# Patient Record
Sex: MALE | Race: OTHER | Employment: FULL TIME | URBAN - METROPOLITAN AREA
[De-identification: names, ages, dates, MRNs, and addresses within clinical notes are randomized per-mention and may not be internally consistent; named-entity substitution may affect disease eponyms.]

---

## 2020-07-20 ENCOUNTER — HOSPITAL ENCOUNTER (INPATIENT)
Age: 25
LOS: 2 days | Discharge: HOME | DRG: 923 | End: 2020-07-22
Attending: EMERGENCY MEDICINE | Admitting: FAMILY MEDICINE
Payer: SELF-PAY

## 2020-07-20 DIAGNOSIS — N17.9 AKI (ACUTE KIDNEY INJURY) (HCC): Primary | ICD-10-CM

## 2020-07-20 DIAGNOSIS — E86.0 DEHYDRATION: ICD-10-CM

## 2020-07-20 DIAGNOSIS — E11.65 TYPE 2 DIABETES MELLITUS WITH HYPERGLYCEMIA, WITHOUT LONG-TERM CURRENT USE OF INSULIN (HCC): ICD-10-CM

## 2020-07-20 PROBLEM — E87.1 HYPONATREMIA: Status: ACTIVE | Noted: 2020-07-20

## 2020-07-20 PROBLEM — R79.89 ELEVATED LFTS: Status: ACTIVE | Noted: 2020-07-20

## 2020-07-20 LAB
ALBUMIN SERPL-MCNC: 4.7 G/DL (ref 3.5–5)
ALBUMIN/GLOB SERPL: 1.1 {RATIO} (ref 1.2–3.5)
ALP SERPL-CCNC: 112 U/L (ref 50–136)
ALT SERPL-CCNC: 391 U/L (ref 12–65)
ANION GAP SERPL CALC-SCNC: 13 MMOL/L (ref 7–16)
AST SERPL-CCNC: 162 U/L (ref 15–37)
BASOPHILS # BLD: 0.1 K/UL (ref 0–0.2)
BASOPHILS NFR BLD: 1 % (ref 0–2)
BILIRUB SERPL-MCNC: 1.3 MG/DL (ref 0.2–1.1)
BUN SERPL-MCNC: 18 MG/DL (ref 6–23)
CALCIUM SERPL-MCNC: 9.9 MG/DL (ref 8.3–10.4)
CHLORIDE SERPL-SCNC: 93 MMOL/L (ref 98–107)
CO2 SERPL-SCNC: 25 MMOL/L (ref 21–32)
CREAT SERPL-MCNC: 2.68 MG/DL (ref 0.8–1.5)
DIFFERENTIAL METHOD BLD: ABNORMAL
EOSINOPHIL # BLD: 0.1 K/UL (ref 0–0.8)
EOSINOPHIL NFR BLD: 1 % (ref 0.5–7.8)
ERYTHROCYTE [DISTWIDTH] IN BLOOD BY AUTOMATED COUNT: 12.3 % (ref 11.9–14.6)
GLOBULIN SER CALC-MCNC: 4.2 G/DL (ref 2.3–3.5)
GLUCOSE BLD STRIP.AUTO-MCNC: 230 MG/DL (ref 65–100)
GLUCOSE BLD STRIP.AUTO-MCNC: 296 MG/DL (ref 65–100)
GLUCOSE SERPL-MCNC: 279 MG/DL (ref 65–100)
HCT VFR BLD AUTO: 45 % (ref 41.1–50.3)
HGB BLD-MCNC: 16.1 G/DL (ref 13.6–17.2)
IMM GRANULOCYTES # BLD AUTO: 0.1 K/UL (ref 0–0.5)
IMM GRANULOCYTES NFR BLD AUTO: 1 % (ref 0–5)
LYMPHOCYTES # BLD: 1.2 K/UL (ref 0.5–4.6)
LYMPHOCYTES NFR BLD: 12 % (ref 13–44)
MCH RBC QN AUTO: 31.4 PG (ref 26.1–32.9)
MCHC RBC AUTO-ENTMCNC: 35.8 G/DL (ref 31.4–35)
MCV RBC AUTO: 87.9 FL (ref 79.6–97.8)
MONOCYTES # BLD: 0.8 K/UL (ref 0.1–1.3)
MONOCYTES NFR BLD: 8 % (ref 4–12)
NEUTS SEG # BLD: 8.2 K/UL (ref 1.7–8.2)
NEUTS SEG NFR BLD: 79 % (ref 43–78)
NRBC # BLD: 0 K/UL (ref 0–0.2)
PLATELET # BLD AUTO: 239 K/UL (ref 150–450)
PMV BLD AUTO: 9.7 FL (ref 9.4–12.3)
POTASSIUM SERPL-SCNC: 3.7 MMOL/L (ref 3.5–5.1)
PROT SERPL-MCNC: 8.9 G/DL (ref 6.3–8.2)
RBC # BLD AUTO: 5.12 M/UL (ref 4.23–5.6)
SODIUM SERPL-SCNC: 131 MMOL/L (ref 136–145)
WBC # BLD AUTO: 10.4 K/UL (ref 4.3–11.1)

## 2020-07-20 PROCEDURE — 74011250636 HC RX REV CODE- 250/636: Performed by: EMERGENCY MEDICINE

## 2020-07-20 PROCEDURE — 65270000029 HC RM PRIVATE

## 2020-07-20 PROCEDURE — 74011636637 HC RX REV CODE- 636/637: Performed by: FAMILY MEDICINE

## 2020-07-20 PROCEDURE — 85025 COMPLETE CBC W/AUTO DIFF WBC: CPT

## 2020-07-20 PROCEDURE — 96360 HYDRATION IV INFUSION INIT: CPT

## 2020-07-20 PROCEDURE — 80053 COMPREHEN METABOLIC PANEL: CPT

## 2020-07-20 PROCEDURE — 99284 EMERGENCY DEPT VISIT MOD MDM: CPT

## 2020-07-20 PROCEDURE — 81003 URINALYSIS AUTO W/O SCOPE: CPT

## 2020-07-20 PROCEDURE — 82962 GLUCOSE BLOOD TEST: CPT

## 2020-07-20 PROCEDURE — 74011250636 HC RX REV CODE- 250/636: Performed by: FAMILY MEDICINE

## 2020-07-20 RX ORDER — METFORMIN HYDROCHLORIDE 500 MG/1
500 TABLET ORAL 2 TIMES DAILY WITH MEALS
Status: DISCONTINUED | OUTPATIENT
Start: 2020-07-21 | End: 2020-07-21

## 2020-07-20 RX ORDER — ONDANSETRON 2 MG/ML
4 INJECTION INTRAMUSCULAR; INTRAVENOUS
Status: DISCONTINUED | OUTPATIENT
Start: 2020-07-20 | End: 2020-07-22 | Stop reason: HOSPADM

## 2020-07-20 RX ORDER — ADHESIVE BANDAGE
30 BANDAGE TOPICAL DAILY PRN
Status: DISCONTINUED | OUTPATIENT
Start: 2020-07-20 | End: 2020-07-22 | Stop reason: HOSPADM

## 2020-07-20 RX ORDER — METFORMIN HYDROCHLORIDE 500 MG/1
1000 TABLET ORAL 2 TIMES DAILY WITH MEALS
COMMUNITY
End: 2020-07-22

## 2020-07-20 RX ORDER — SODIUM CHLORIDE 0.9 % (FLUSH) 0.9 %
5-40 SYRINGE (ML) INJECTION EVERY 8 HOURS
Status: DISCONTINUED | OUTPATIENT
Start: 2020-07-20 | End: 2020-07-22 | Stop reason: HOSPADM

## 2020-07-20 RX ORDER — SODIUM CHLORIDE 9 MG/ML
150 INJECTION, SOLUTION INTRAVENOUS CONTINUOUS
Status: DISCONTINUED | OUTPATIENT
Start: 2020-07-20 | End: 2020-07-21

## 2020-07-20 RX ORDER — SODIUM CHLORIDE, SODIUM LACTATE, POTASSIUM CHLORIDE, CALCIUM CHLORIDE 600; 310; 30; 20 MG/100ML; MG/100ML; MG/100ML; MG/100ML
125 INJECTION, SOLUTION INTRAVENOUS CONTINUOUS
Status: DISCONTINUED | OUTPATIENT
Start: 2020-07-20 | End: 2020-07-22 | Stop reason: HOSPADM

## 2020-07-20 RX ORDER — HEPARIN SODIUM 5000 [USP'U]/ML
5000 INJECTION, SOLUTION INTRAVENOUS; SUBCUTANEOUS EVERY 8 HOURS
Status: DISCONTINUED | OUTPATIENT
Start: 2020-07-20 | End: 2020-07-22 | Stop reason: HOSPADM

## 2020-07-20 RX ORDER — SODIUM CHLORIDE 0.9 % (FLUSH) 0.9 %
5-40 SYRINGE (ML) INJECTION AS NEEDED
Status: DISCONTINUED | OUTPATIENT
Start: 2020-07-20 | End: 2020-07-22 | Stop reason: HOSPADM

## 2020-07-20 RX ORDER — ACETAMINOPHEN 325 MG/1
650 TABLET ORAL
Status: DISCONTINUED | OUTPATIENT
Start: 2020-07-20 | End: 2020-07-22 | Stop reason: HOSPADM

## 2020-07-20 RX ADMIN — HEPARIN SODIUM 5000 UNITS: 5000 INJECTION INTRAVENOUS; SUBCUTANEOUS at 22:45

## 2020-07-20 RX ADMIN — SODIUM CHLORIDE 150 ML/HR: 900 INJECTION, SOLUTION INTRAVENOUS at 22:45

## 2020-07-20 RX ADMIN — INSULIN HUMAN 6 UNITS: 100 INJECTION, SOLUTION PARENTERAL at 22:45

## 2020-07-20 RX ADMIN — SODIUM CHLORIDE, SODIUM LACTATE, POTASSIUM CHLORIDE, AND CALCIUM CHLORIDE 150 ML/HR: 600; 310; 30; 20 INJECTION, SOLUTION INTRAVENOUS at 20:23

## 2020-07-20 RX ADMIN — SODIUM CHLORIDE 1000 ML: 900 INJECTION, SOLUTION INTRAVENOUS at 18:55

## 2020-07-20 NOTE — ED PROVIDER NOTES
Patient with headache lightheadedness and nausea and vomiting today. Checked his blood sugar and found it to be above 300. Patient has a history of type 2 diabetes mellitus and takes Glucophage 500 twice daily but no insulin. He was diagnosed 1 year ago. He denies any abdominal pain or diarrhea with this nausea and vomiting. He denies runny nose congestion sore throat cough or fever. He denies blood in his bowel movements or black bowel movements or any urinary symptoms. The history is provided by the patient. High Blood Sugar    This is a recurrent problem. The current episode started 3 to 5 hours ago. The problem occurs constantly. The problem has been gradually improving. The pain is associated with vomiting. The patient is experiencing no pain. Associated symptoms include nausea and vomiting. Pertinent negatives include no fever, no diarrhea, no hematochezia, no melena, no dysuria, no frequency, no headaches, no arthralgias, no myalgias, no chest pain and no back pain. Nothing worsens the pain. The pain is relieved by nothing. Past Medical History:   Diagnosis Date    Diabetes ([facility] [address] [address]     type 2. Takes metformin    Hypertension        History reviewed. No pertinent surgical history. History reviewed. No pertinent family history. Social History     Socioeconomic History    Marital status: Not on file     Spouse name: Not on file    Number of children: Not on file    Years of education: Not on file    Highest education level: Not on file   Occupational History    Not on file   Social Needs    Financial resource strain: Not on file    Food insecurity     Worry: Not on file     Inability: Not on file    Transportation needs     Medical: Not on file     Non-medical: Not on file   Tobacco Use    Smoking status: Never Smoker    Smokeless tobacco: Never Used   Substance and Sexual Activity    Alcohol use:  Yes     Alcohol/week: 24.0 standard drinks     Types: 24 Cans of beer per week  Drug use: Not Currently    Sexual activity: Not on file   Lifestyle    Physical activity     Days per week: Not on file     Minutes per session: Not on file    Stress: Not on file   Relationships    Social connections     Talks on phone: Not on file     Gets together: Not on file     Attends Hindu service: Not on file     Active member of club or organization: Not on file     Attends meetings of clubs or organizations: Not on file     Relationship status: Not on file    Intimate partner violence     Fear of current or ex partner: Not on file     Emotionally abused: Not on file     Physically abused: Not on file     Forced sexual activity: Not on file   Other Topics Concern    Not on file   Social History Narrative    Not on file         ALLERGIES: Patient has no known allergies. Review of Systems   Constitutional: Negative for chills and fever. HENT: Negative for congestion, rhinorrhea and sore throat. Eyes: Negative for photophobia and redness. Respiratory: Negative for cough and shortness of breath. Cardiovascular: Negative for chest pain and leg swelling. Gastrointestinal: Positive for nausea and vomiting. Negative for abdominal pain, diarrhea, hematochezia and melena. Endocrine: Positive for polyuria. Negative for polydipsia. Genitourinary: Negative for dysuria and frequency. Musculoskeletal: Negative for arthralgias, back pain and myalgias. Neurological: Negative for weakness, numbness and headaches. Vitals:    07/20/20 1830   BP: 141/85   Pulse: 100   Resp: 18   Temp: 99.5 °F (37.5 °C)   SpO2: 98%   Weight: 104.3 kg (230 lb)   Height: 5' 5\" (1.651 m)            Physical Exam  Vitals signs and nursing note reviewed. Constitutional:       Appearance: He is well-developed. HENT:      Mouth/Throat:      Pharynx: No oropharyngeal exudate. Eyes:      Conjunctiva/sclera: Conjunctivae normal.      Pupils: Pupils are equal, round, and reactive to light.    Neck: Musculoskeletal: Neck supple. Cardiovascular:      Rate and Rhythm: Normal rate and regular rhythm. Heart sounds: Normal heart sounds. Pulmonary:      Effort: Pulmonary effort is normal.      Breath sounds: Normal breath sounds. Abdominal:      General: Bowel sounds are normal. There is no distension. Palpations: Abdomen is soft. Tenderness: There is no abdominal tenderness. There is no guarding or rebound. Musculoskeletal: Normal range of motion. General: No tenderness. Lymphadenopathy:      Cervical: No cervical adenopathy. Skin:     General: Skin is warm and dry. Neurological:      Mental Status: He is alert and oriented to person, place, and time. MDM  Number of Diagnoses or Management Options  Diagnosis management comments: Nausea is resolved upon my valuation and blood sugar is 290 on fingerstick.          Procedures

## 2020-07-20 NOTE — ED NOTES
Took bedside report from Amy Hahn, [facility]. Call bell within  Reach, no needs at this time.

## 2020-07-21 ENCOUNTER — APPOINTMENT (OUTPATIENT)
Dept: GENERAL RADIOLOGY | Age: 25
DRG: 923 | End: 2020-07-21
Payer: SELF-PAY

## 2020-07-21 LAB
ALBUMIN SERPL-MCNC: 4 G/DL (ref 3.5–5)
ALBUMIN/GLOB SERPL: 1.1 {RATIO} (ref 1.2–3.5)
ALP SERPL-CCNC: 97 U/L (ref 50–136)
ALT SERPL-CCNC: 327 U/L (ref 12–65)
ANION GAP SERPL CALC-SCNC: 5 MMOL/L (ref 7–16)
ANION GAP SERPL CALC-SCNC: 9 MMOL/L (ref 7–16)
APPEARANCE UR: CLEAR
AST SERPL-CCNC: 119 U/L (ref 15–37)
BASOPHILS # BLD: 0 K/UL (ref 0–0.2)
BASOPHILS # BLD: 0.1 K/UL (ref 0–0.2)
BASOPHILS NFR BLD: 1 % (ref 0–2)
BASOPHILS NFR BLD: 1 % (ref 0–2)
BILIRUB SERPL-MCNC: 1 MG/DL (ref 0.2–1.1)
BILIRUB UR QL: NEGATIVE
BUN SERPL-MCNC: 14 MG/DL (ref 6–23)
BUN SERPL-MCNC: 9 MG/DL (ref 6–23)
CALCIUM SERPL-MCNC: 8.3 MG/DL (ref 8.3–10.4)
CALCIUM SERPL-MCNC: 9 MG/DL (ref 8.3–10.4)
CHLORIDE SERPL-SCNC: 100 MMOL/L (ref 98–107)
CHLORIDE SERPL-SCNC: 103 MMOL/L (ref 98–107)
CO2 SERPL-SCNC: 26 MMOL/L (ref 21–32)
CO2 SERPL-SCNC: 28 MMOL/L (ref 21–32)
COLOR UR: YELLOW
CREAT SERPL-MCNC: 0.81 MG/DL (ref 0.8–1.5)
CREAT SERPL-MCNC: 1.25 MG/DL (ref 0.8–1.5)
DIFFERENTIAL METHOD BLD: ABNORMAL
DIFFERENTIAL METHOD BLD: NORMAL
EOSINOPHIL # BLD: 0.2 K/UL (ref 0–0.8)
EOSINOPHIL # BLD: 0.2 K/UL (ref 0–0.8)
EOSINOPHIL NFR BLD: 4 % (ref 0.5–7.8)
EOSINOPHIL NFR BLD: 4 % (ref 0.5–7.8)
ERYTHROCYTE [DISTWIDTH] IN BLOOD BY AUTOMATED COUNT: 12.6 % (ref 11.9–14.6)
ERYTHROCYTE [DISTWIDTH] IN BLOOD BY AUTOMATED COUNT: 12.6 % (ref 11.9–14.6)
EST. AVERAGE GLUCOSE BLD GHB EST-MCNC: 223 MG/DL
GLOBULIN SER CALC-MCNC: 3.5 G/DL (ref 2.3–3.5)
GLUCOSE BLD STRIP.AUTO-MCNC: 154 MG/DL (ref 65–100)
GLUCOSE BLD STRIP.AUTO-MCNC: 230 MG/DL (ref 65–100)
GLUCOSE BLD STRIP.AUTO-MCNC: 269 MG/DL (ref 65–100)
GLUCOSE BLD STRIP.AUTO-MCNC: 306 MG/DL (ref 65–100)
GLUCOSE SERPL-MCNC: 119 MG/DL (ref 65–100)
GLUCOSE SERPL-MCNC: 260 MG/DL (ref 65–100)
GLUCOSE UR STRIP.AUTO-MCNC: NEGATIVE MG/DL
HBA1C MFR BLD: 9.4 %
HCT VFR BLD AUTO: 37.7 % (ref 41.1–50.3)
HCT VFR BLD AUTO: 43.7 % (ref 41.1–50.3)
HGB BLD-MCNC: 13 G/DL (ref 13.6–17.2)
HGB BLD-MCNC: 15.3 G/DL (ref 13.6–17.2)
HGB UR QL STRIP: NEGATIVE
IMM GRANULOCYTES # BLD AUTO: 0 K/UL (ref 0–0.5)
IMM GRANULOCYTES # BLD AUTO: 0 K/UL (ref 0–0.5)
IMM GRANULOCYTES NFR BLD AUTO: 1 % (ref 0–5)
IMM GRANULOCYTES NFR BLD AUTO: 1 % (ref 0–5)
KETONES UR QL STRIP.AUTO: NEGATIVE MG/DL
LACTATE SERPL-SCNC: 0.8 MMOL/L (ref 0.4–2)
LEUKOCYTE ESTERASE UR QL STRIP.AUTO: NEGATIVE
LYMPHOCYTES # BLD: 1.8 K/UL (ref 0.5–4.6)
LYMPHOCYTES # BLD: 1.9 K/UL (ref 0.5–4.6)
LYMPHOCYTES NFR BLD: 30 % (ref 13–44)
LYMPHOCYTES NFR BLD: 30 % (ref 13–44)
MAGNESIUM SERPL-MCNC: 1.6 MG/DL (ref 1.8–2.4)
MCH RBC QN AUTO: 31 PG (ref 26.1–32.9)
MCH RBC QN AUTO: 31.5 PG (ref 26.1–32.9)
MCHC RBC AUTO-ENTMCNC: 34.5 G/DL (ref 31.4–35)
MCHC RBC AUTO-ENTMCNC: 35 G/DL (ref 31.4–35)
MCV RBC AUTO: 89.8 FL (ref 79.6–97.8)
MCV RBC AUTO: 89.9 FL (ref 79.6–97.8)
MONOCYTES # BLD: 0.5 K/UL (ref 0.1–1.3)
MONOCYTES # BLD: 0.6 K/UL (ref 0.1–1.3)
MONOCYTES NFR BLD: 8 % (ref 4–12)
MONOCYTES NFR BLD: 9 % (ref 4–12)
NEUTS SEG # BLD: 3.3 K/UL (ref 1.7–8.2)
NEUTS SEG # BLD: 3.7 K/UL (ref 1.7–8.2)
NEUTS SEG NFR BLD: 56 % (ref 43–78)
NEUTS SEG NFR BLD: 57 % (ref 43–78)
NITRITE UR QL STRIP.AUTO: NEGATIVE
NRBC # BLD: 0 K/UL (ref 0–0.2)
NRBC # BLD: 0 K/UL (ref 0–0.2)
PH UR STRIP: 5.5 [PH] (ref 5–9)
PLATELET # BLD AUTO: 199 K/UL (ref 150–450)
PLATELET # BLD AUTO: 226 K/UL (ref 150–450)
PMV BLD AUTO: 9.5 FL (ref 9.4–12.3)
PMV BLD AUTO: 9.7 FL (ref 9.4–12.3)
POTASSIUM SERPL-SCNC: 3.2 MMOL/L (ref 3.5–5.1)
POTASSIUM SERPL-SCNC: 3.2 MMOL/L (ref 3.5–5.1)
PROT SERPL-MCNC: 7.5 G/DL (ref 6.3–8.2)
PROT UR STRIP-MCNC: NEGATIVE MG/DL
RBC # BLD AUTO: 4.2 M/UL (ref 4.23–5.6)
RBC # BLD AUTO: 4.86 M/UL (ref 4.23–5.6)
SODIUM SERPL-SCNC: 135 MMOL/L (ref 136–145)
SODIUM SERPL-SCNC: 136 MMOL/L (ref 136–145)
SP GR UR REFRACTOMETRY: 1.01 (ref 1–1.02)
UROBILINOGEN UR QL STRIP.AUTO: 1 EU/DL (ref 0.2–1)
WBC # BLD AUTO: 5.9 K/UL (ref 4.3–11.1)
WBC # BLD AUTO: 6.5 K/UL (ref 4.3–11.1)

## 2020-07-21 PROCEDURE — 83735 ASSAY OF MAGNESIUM: CPT

## 2020-07-21 PROCEDURE — 74011250636 HC RX REV CODE- 250/636: Performed by: NURSE PRACTITIONER

## 2020-07-21 PROCEDURE — 74011250636 HC RX REV CODE- 250/636: Performed by: FAMILY MEDICINE

## 2020-07-21 PROCEDURE — 87040 BLOOD CULTURE FOR BACTERIA: CPT

## 2020-07-21 PROCEDURE — 85025 COMPLETE CBC W/AUTO DIFF WBC: CPT

## 2020-07-21 PROCEDURE — 74011636637 HC RX REV CODE- 636/637: Performed by: NURSE PRACTITIONER

## 2020-07-21 PROCEDURE — 80053 COMPREHEN METABOLIC PANEL: CPT

## 2020-07-21 PROCEDURE — 83036 HEMOGLOBIN GLYCOSYLATED A1C: CPT

## 2020-07-21 PROCEDURE — 81003 URINALYSIS AUTO W/O SCOPE: CPT

## 2020-07-21 PROCEDURE — 83605 ASSAY OF LACTIC ACID: CPT

## 2020-07-21 PROCEDURE — 82962 GLUCOSE BLOOD TEST: CPT

## 2020-07-21 PROCEDURE — 36415 COLL VENOUS BLD VENIPUNCTURE: CPT

## 2020-07-21 PROCEDURE — 71046 X-RAY EXAM CHEST 2 VIEWS: CPT

## 2020-07-21 PROCEDURE — 87086 URINE CULTURE/COLONY COUNT: CPT

## 2020-07-21 PROCEDURE — 74011250637 HC RX REV CODE- 250/637: Performed by: FAMILY MEDICINE

## 2020-07-21 PROCEDURE — 74011250637 HC RX REV CODE- 250/637: Performed by: NURSE PRACTITIONER

## 2020-07-21 PROCEDURE — 65270000029 HC RM PRIVATE

## 2020-07-21 PROCEDURE — 74011636637 HC RX REV CODE- 636/637: Performed by: FAMILY MEDICINE

## 2020-07-21 RX ORDER — INSULIN LISPRO 100 [IU]/ML
INJECTION, SOLUTION INTRAVENOUS; SUBCUTANEOUS
Status: DISCONTINUED | OUTPATIENT
Start: 2020-07-21 | End: 2020-07-22 | Stop reason: HOSPADM

## 2020-07-21 RX ORDER — TRAMADOL HYDROCHLORIDE 50 MG/1
50 TABLET ORAL
Status: DISCONTINUED | OUTPATIENT
Start: 2020-07-21 | End: 2020-07-22 | Stop reason: HOSPADM

## 2020-07-21 RX ORDER — POTASSIUM CHLORIDE 20 MEQ/1
40 TABLET, EXTENDED RELEASE ORAL
Status: COMPLETED | OUTPATIENT
Start: 2020-07-21 | End: 2020-07-21

## 2020-07-21 RX ORDER — MAGNESIUM SULFATE HEPTAHYDRATE 40 MG/ML
2 INJECTION, SOLUTION INTRAVENOUS ONCE
Status: COMPLETED | OUTPATIENT
Start: 2020-07-21 | End: 2020-07-21

## 2020-07-21 RX ADMIN — INSULIN HUMAN 9 UNITS: 100 INJECTION, SOLUTION PARENTERAL at 07:31

## 2020-07-21 RX ADMIN — MAGNESIUM SULFATE HEPTAHYDRATE 2 G: 40 INJECTION, SOLUTION INTRAVENOUS at 19:22

## 2020-07-21 RX ADMIN — INSULIN HUMAN 6 UNITS: 100 INJECTION, SOLUTION PARENTERAL at 11:56

## 2020-07-21 RX ADMIN — METFORMIN HYDROCHLORIDE 500 MG: 500 TABLET ORAL at 07:29

## 2020-07-21 RX ADMIN — POTASSIUM CHLORIDE 40 MEQ: 1500 TABLET, EXTENDED RELEASE ORAL at 12:16

## 2020-07-21 RX ADMIN — INSULIN LISPRO 8 UNITS: 100 INJECTION, SOLUTION INTRAVENOUS; SUBCUTANEOUS at 21:26

## 2020-07-21 RX ADMIN — INSULIN LISPRO 2 UNITS: 100 INJECTION, SOLUTION INTRAVENOUS; SUBCUTANEOUS at 16:15

## 2020-07-21 RX ADMIN — POTASSIUM CHLORIDE 40 MEQ: 1500 TABLET, EXTENDED RELEASE ORAL at 16:14

## 2020-07-21 RX ADMIN — HEPARIN SODIUM 5000 UNITS: 5000 INJECTION INTRAVENOUS; SUBCUTANEOUS at 06:36

## 2020-07-21 RX ADMIN — POTASSIUM CHLORIDE 40 MEQ: 1500 TABLET, EXTENDED RELEASE ORAL at 19:22

## 2020-07-21 RX ADMIN — ACETAMINOPHEN 650 MG: 325 TABLET, FILM COATED ORAL at 16:14

## 2020-07-21 RX ADMIN — SODIUM CHLORIDE 150 ML/HR: 900 INJECTION, SOLUTION INTRAVENOUS at 06:38

## 2020-07-21 RX ADMIN — Medication 10 ML: at 22:00

## 2020-07-21 NOTE — H&P
HOSPITALIST INITIAL HISTORY AND PHYSICAL    NAME:  Balwinder Hightower   Age:  26 y. o.  :   1995   MRN:   [id number]  PCP: Chriss Allan MD  Consulting MD:  Treatment Team: Attending Provider: Heaven Hatch MD; Primary Nurse: Jean Alcaraz RN    CHIEF COMPLAINT: vomiting, weakness    HISTORY OF PRESENT ILLNESS:   Balwinder Hightower is a 26 y.o. male with a past medical history of DM type II and HTN who presents to the ER with report of feeling weak and having nausea and vomiting earlier today. He reports that he has otherwise felt well. Check his BG daily at home and reports mostly seeing low 100s. Denies any fevers, chills, diarrhea, abdominal pain. REVIEW OF SYSTEMS: Comprehensive ROS performed. Admits to nausea, vomiting. Denies fevers, chills, diarrhea, abdominal pain, otherwise negative. Past Medical History:   Diagnosis Date    Diabetes ([facility] [address]     type 2. Takes metformin    Hypertension         History reviewed. No pertinent surgical history. Prior to Admission Medications   Prescriptions Last Dose Informant Patient Reported? Taking?   metFORMIN (GLUCOPHAGE) 500 mg tablet   Yes Yes   Sig: Take 500 mg by mouth two (2) times daily (with meals). Indications: type 2 diabetes mellitus      Facility-Administered Medications: None       No Known Allergies    FAMILY HISTORY: Reviewed. Negative except History reviewed. No pertinent family history. Social History     Tobacco Use    Smoking status: Never Smoker    Smokeless tobacco: Never Used   Substance Use Topics    Alcohol use:  Yes     Alcohol/week: 24.0 standard drinks     Types: 24 Cans of beer per week         Objective:     Visit Vitals  /73   Pulse 91   Temp 99.5 °F (37.5 °C)   Resp 18   Ht 5' 5\" (1.651 m)   Wt 104.3 kg (230 lb)   SpO2 98%   BMI 38.27 kg/m²      Temp (24hrs), Av.5 °F (37.5 °C), Min:99.5 °F (37.5 °C), Max:99.5 °F (37.5 °C)    Oxygen Therapy  O2 Sat (%): 98 % (20)  O2 Device: Room air (07/20/20 1830)  Physical Exam:  General:    The patient is a pleasant young male in no acute distress. Head:   Normocephalic/atraumatic. Eyes:  No palpebral pallor or scleral icterus. ENT:  External auricular and nasal exam within normal limits. Mucous membranes are moist.  Neck:  Supple, non-tender, no JVD. Lungs:   Clear to auscultation bilaterally without wheezes or crackles. No respiratory distress or accessory muscle use. Heart:   Regular rate and rhythm, without murmurs, rubs, or gallops. Abdomen:   Soft, non-tender, non-distended with normoactive bowel sounds. Genitourinary: No tenderness over the bladder or bilateral CVAs. Extremities: Without clubbing, cyanosis, or edema. Skin:     Normal color, texture, and turgor. No rashes, lesions, or jaundice. Pulses: Radial and dorsalis pedis pulses present 2+ bilaterally. Capillary refill <2s. Neurologic: CN II-XII grossly intact and symmetrical.     Moving all four extremities well with no focal deficits. Psychiatric: Pleasant demeanor, appropriate affect. Alert and oriented x 3    Data Review:   Recent Results (from the past 24 hour(s))   GLUCOSE, POC    Collection Time: 07/20/20  6:39 PM   Result Value Ref Range    Glucose (POC) 296 (H) 65 - 100 mg/dL   CBC WITH AUTOMATED DIFF    Collection Time: 07/20/20  6:47 PM   Result Value Ref Range    WBC 10.4 4.3 - 11.1 K/uL    RBC 5.12 4.23 - 5.6 M/uL    HGB 16.1 13.6 - 17.2 g/dL    HCT 45.0 41.1 - 50.3 %    MCV 87.9 79.6 - 97.8 FL    MCH 31.4 26.1 - 32.9 PG    MCHC 35.8 (H) 31.4 - 35.0 g/dL    RDW 12.3 11.9 - 14.6 %    PLATELET 998 095 - 920 K/uL    MPV 9.7 9.4 - 12.3 FL    ABSOLUTE NRBC 0.00 0.0 - 0.2 K/uL    DF AUTOMATED      NEUTROPHILS 79 (H) 43 - 78 %    LYMPHOCYTES 12 (L) 13 - 44 %    MONOCYTES 8 4.0 - 12.0 %    EOSINOPHILS 1 0.5 - 7.8 %    BASOPHILS 1 0.0 - 2.0 %    IMMATURE GRANULOCYTES 1 0.0 - 5.0 %    ABS. NEUTROPHILS 8.2 1.7 - 8.2 K/UL    ABS.  LYMPHOCYTES 1.2 0.5 - 4.6 K/UL ABS. MONOCYTES 0.8 0.1 - 1.3 K/UL    ABS. EOSINOPHILS 0.1 0.0 - 0.8 K/UL    ABS. BASOPHILS 0.1 0.0 - 0.2 K/UL    ABS. IMM. GRANS. 0.1 0.0 - 0.5 K/UL   METABOLIC PANEL, COMPREHENSIVE    Collection Time: 07/20/20  6:47 PM   Result Value Ref Range    Sodium 131 (L) 136 - 145 mmol/L    Potassium 3.7 3.5 - 5.1 mmol/L    Chloride 93 (L) 98 - 107 mmol/L    CO2 25 21 - 32 mmol/L    Anion gap 13 7 - 16 mmol/L    Glucose 279 (H) 65 - 100 mg/dL    BUN 18 6 - 23 MG/DL    Creatinine 2.68 (H) 0.8 - 1.5 MG/DL    GFR est AA 38 (L) >60 ml/min/1.73m2    GFR est non-AA 31 (L) >60 ml/min/1.73m2    Calcium 9.9 8.3 - 10.4 MG/DL    Bilirubin, total 1.3 (H) 0.2 - 1.1 MG/DL    ALT (SGPT) 391 (H) 12 - 65 U/L    AST (SGOT) 162 (H) 15 - 37 U/L    Alk. phosphatase 112 50 - 136 U/L    Protein, total 8.9 (H) 6.3 - 8.2 g/dL    Albumin 4.7 3.5 - 5.0 g/dL    Globulin 4.2 (H) 2.3 - 3.5 g/dL    A-G Ratio 1.1 (L) 1.2 - 3.5         Imaging /Procedures /Studies:  No results found. Assessment and Plan:     Principal Problem:    [facility] (7/20/2020)    Likely 2/2 dehydration. IVF, follow BMP. Active Problems:    Hyperglycemia due to type 2 diabetes mellitus ([facility] [address] (7/20/2020)    Home medications, SSI, IVF      Hyponatremia (7/20/2020)    IV NS, follow BMP. Elevated LFTs (7/20/2020)    Mild, IVF, follow CMP, consider RUQ US. DVT Prophylaxis: Heparin      Code Status: FULL CODE      Disposition: Admit to med/surg for evaluation and treatment as per above.       Anticipated discharge: 2-3 days     Signed By: Mehran Gregorio MD     July 20, 2020

## 2020-07-21 NOTE — PROGRESS NOTES
07/21/20 1530   Dual Skin Pressure Injury Assessment   Dual Skin Pressure Injury Assessment WDL   Second Care Provider (Based on [facility]) [facility]

## 2020-07-21 NOTE — PROGRESS NOTES
Problem: Falls - Risk of  Goal: *Absence of Falls  Description: Document [facility] Fall Risk and appropriate interventions in the flowsheet.   Outcome: Progressing Towards Goal  Note: Fall Risk Interventions:            Medication Interventions: Teach patient to arise slowly

## 2020-07-21 NOTE — PROGRESS NOTES
Visited with patient as no PCP listed in chart. Patient states no PCP and no insurance. Explained the [address] program in detail and provided patient with resources. Contact information for [facility] with [address] given to patient and patient encouraged to follow up with her as soon as possible. Patient also given contact information for Daron Lira with [employer] and encouraged to call to get screened for Medicaid/Insurance eligibility and/or financial assistance.      Lena Comings, [place]    [address] [place] [address]    * [email or web address]    ( [phone]

## 2020-07-21 NOTE — ED NOTES
Started new ivf, emptied 800 clear yellow urine from urinal. No needs at this time, call bell within reach

## 2020-07-21 NOTE — ED NOTES
Explained to pt why staying in ER. Helped pt get into hospital gown for comfort. Hung ivf, gave heparin and insulin. Provided water, crackers , urinal and call bell.  Pt in nad with no needs at this time

## 2020-07-21 NOTE — PROGRESS NOTES
END OF SHIFT NOTE:    Intake/Output  No intake/output data recorded. Voiding: YES  Catheter: NO  Drain:              Stool:  0 occurrences. Emesis:  0 occurrences. VITAL SIGNS  Patient Vitals for the past 12 hrs:   Temp Pulse Resp BP SpO2   07/21/20 1518 (!) 100.8 °F (38.2 °C) 74 18 127/79 99 %       Pain Assessment  Pain 1  Pain Scale 1: Numeric (0 - 10) (07/21/20 1530)  Pain Intensity 1: 0 (07/21/20 1530)  Patient Stated Pain Goal: 0 (07/21/20 1530)  Pain Onset 1: 16:00 today (07/20/20 1830)  Pain Location 1: Flank (07/20/20 1830)  Pain Orientation 1: Left;Right (07/20/20 1830)    Ambulating  Yes    Additional Information: Patient arrived around 1500. Was running fever when he came up. BC done, chest x-ray. Needs urine. VSS. No needs voiced. Shift report given to oncoming nurse at the bedside.     Yasmani Butts

## 2020-07-21 NOTE — PROGRESS NOTES
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 Unable to retain any fluids. Glucose on arrival 282. Patient reports medication compliance, checks glucose daily, usually in the high 100's. States he was taking insulin for the fist six months, dropping A1C into the 5s. He was then changed to metformin 1000 mg bid with slowly climbing A1C since, believes the last one to be in the 7s. A1C done today 9.4. Replacing Mag and K+. IVF continued. ADDITIONAL HISTORY:  NIDDM diagnosed 2019 on metformin, hypertension, drinks 4 beers daily,     Current Facility-Administered Medications   Medication Dose Route Frequency    lactated Ringers infusion  150 mL/hr IntraVENous CONTINUOUS    metFORMIN (GLUCOPHAGE) tablet 500 mg  500 mg Oral BID WITH MEALS    insulin regular (NOVOLIN R, HUMULIN R) injection   SubCUTAneous AC&HS    sodium chloride (NS) flush 5-40 mL  5-40 mL IntraVENous Q8H    sodium chloride (NS) flush 5-40 mL  5-40 mL IntraVENous PRN    acetaminophen (TYLENOL) tablet 650 mg  650 mg Oral Q4H PRN    ondansetron (ZOFRAN) injection 4 mg  4 mg IntraVENous Q4H PRN    magnesium hydroxide (MILK OF MAGNESIA) 400 mg/5 mL oral suspension 30 mL  30 mL Oral DAILY PRN    0.9% sodium chloride infusion  150 mL/hr IntraVENous CONTINUOUS    heparin (porcine) injection 5,000 Units  5,000 Units SubCUTAneous Q8H     Current Outpatient Medications   Medication Sig    metFORMIN (GLUCOPHAGE) 500 mg tablet Take 500 mg by mouth two (2) times daily (with meals). Indications: type 2 diabetes mellitus      Review of Systems  A comprehensive review of systems was negative except for that written in the HPI.     Objective:     Visit Vitals  /66   Pulse 88 Temp 98.8 °F (37.1 °C)   Resp 18   Ht 5' 5\" (1.651 m)   Wt 104.3 kg (230 lb)   SpO2 96%   BMI 38.27 kg/m²      O2 Device: Room air    Temp (24hrs), Av.2 °F (37.3 °C), Min:98.8 °F (37.1 °C), Max:99.5 °F (37.5 °C)    General appearance: Oriented, alert, cooperative, does not appear acutely ill or in DKA. Well nourished, dry mucus membranes. Head: Normocephalic, without obvious abnormality, atraumatic  Eyes: conjunctivae/corneas clear. PERRL  Throat: Lips, mucosa, and tongue dry. Teeth and gums normal  Neck: supple, symmetrical, trachea midline, and no JVD  Lungs: clear to auscultation bilaterally  Heart: regular rate and rhythm, S1, S2 normal, no murmur, click, rub or gallop  Abdomen: soft, non-tender. Bowel sounds normal. No masses,  no organomegaly  Extremities: extremities normal, atraumatic, no cyanosis or edema  Skin: Skin color normal, texture, turgor dry. No rashes or lesions  Neurologic: Grossly normal    Additional comments: Notes,orders, test results, vitals reviewed    Data Review  Recent Results (from the past 24 hour(s))   GLUCOSE, POC    Collection Time: 20  6:39 PM   Result Value Ref Range    Glucose (POC) 296 (H) 65 - 100 mg/dL   CBC WITH AUTOMATED DIFF    Collection Time: 20  6:47 PM   Result Value Ref Range    WBC 10.4 4.3 - 11.1 K/uL    RBC 5.12 4.23 - 5.6 M/uL    HGB 16.1 13.6 - 17.2 g/dL    HCT 45.0 41.1 - 50.3 %    MCV 87.9 79.6 - 97.8 FL    MCH 31.4 26.1 - 32.9 PG    MCHC 35.8 (H) 31.4 - 35.0 g/dL    RDW 12.3 11.9 - 14.6 %    PLATELET 557 138 - 172 K/uL    MPV 9.7 9.4 - 12.3 FL    ABSOLUTE NRBC 0.00 0.0 - 0.2 K/uL    DF AUTOMATED      NEUTROPHILS 79 (H) 43 - 78 %    LYMPHOCYTES 12 (L) 13 - 44 %    MONOCYTES 8 4.0 - 12.0 %    EOSINOPHILS 1 0.5 - 7.8 %    BASOPHILS 1 0.0 - 2.0 %    IMMATURE GRANULOCYTES 1 0.0 - 5.0 %    ABS. NEUTROPHILS 8.2 1.7 - 8.2 K/UL    ABS. LYMPHOCYTES 1.2 0.5 - 4.6 K/UL    ABS. MONOCYTES 0.8 0.1 - 1.3 K/UL    ABS.  EOSINOPHILS 0.1 0.0 - 0.8 K/UL ABS. BASOPHILS 0.1 0.0 - 0.2 K/UL    ABS. IMM. GRANS. 0.1 0.0 - 0.5 K/UL   METABOLIC PANEL, COMPREHENSIVE    Collection Time: 07/20/20  6:47 PM   Result Value Ref Range    Sodium 131 (L) 136 - 145 mmol/L    Potassium 3.7 3.5 - 5.1 mmol/L    Chloride 93 (L) 98 - 107 mmol/L    CO2 25 21 - 32 mmol/L    Anion gap 13 7 - 16 mmol/L    Glucose 279 (H) 65 - 100 mg/dL    BUN 18 6 - 23 MG/DL    Creatinine 2.68 (H) 0.8 - 1.5 MG/DL    GFR est AA 38 (L) >60 ml/min/1.73m2    GFR est non-AA 31 (L) >60 ml/min/1.73m2    Calcium 9.9 8.3 - 10.4 MG/DL    Bilirubin, total 1.3 (H) 0.2 - 1.1 MG/DL    ALT (SGPT) 391 (H) 12 - 65 U/L    AST (SGOT) 162 (H) 15 - 37 U/L    Alk.  phosphatase 112 50 - 136 U/L    Protein, total 8.9 (H) 6.3 - 8.2 g/dL    Albumin 4.7 3.5 - 5.0 g/dL    Globulin 4.2 (H) 2.3 - 3.5 g/dL    A-G Ratio 1.1 (L) 1.2 - 3.5     GLUCOSE, POC    Collection Time: 07/20/20 10:44 PM   Result Value Ref Range    Glucose (POC) 230 (H) 65 - 683 mg/dL   METABOLIC PANEL, BASIC    Collection Time: 07/21/20  4:31 AM   Result Value Ref Range    Sodium 135 (L) 136 - 145 mmol/L    Potassium 3.2 (L) 3.5 - 5.1 mmol/L    Chloride 100 98 - 107 mmol/L    CO2 26 21 - 32 mmol/L    Anion gap 9 7 - 16 mmol/L    Glucose 260 (H) 65 - 100 mg/dL    BUN 14 6 - 23 MG/DL    Creatinine 1.25 0.8 - 1.5 MG/DL    GFR est AA >60 >60 ml/min/1.73m2    GFR est non-AA >60 >60 ml/min/1.73m2    Calcium 8.3 8.3 - 10.4 MG/DL   CBC WITH AUTOMATED DIFF    Collection Time: 07/21/20  4:31 AM   Result Value Ref Range    WBC 6.5 4.3 - 11.1 K/uL    RBC 4.20 (L) 4.23 - 5.6 M/uL    HGB 13.0 (L) 13.6 - 17.2 g/dL    HCT 37.7 (L) 41.1 - 50.3 %    MCV 89.8 79.6 - 97.8 FL    MCH 31.0 26.1 - 32.9 PG    MCHC 34.5 31.4 - 35.0 g/dL    RDW 12.6 11.9 - 14.6 %    PLATELET 788 990 - 997 K/uL    MPV 9.5 9.4 - 12.3 FL    ABSOLUTE NRBC 0.00 0.0 - 0.2 K/uL    DF AUTOMATED      NEUTROPHILS 57 43 - 78 %    LYMPHOCYTES 30 13 - 44 %    MONOCYTES 9 4.0 - 12.0 %    EOSINOPHILS 4 0.5 - 7.8 %    BASOPHILS 1 0.0 - 2.0 %    IMMATURE GRANULOCYTES 1 0.0 - 5.0 %    ABS. NEUTROPHILS 3.7 1.7 - 8.2 K/UL    ABS. LYMPHOCYTES 1.9 0.5 - 4.6 K/UL    ABS. MONOCYTES 0.6 0.1 - 1.3 K/UL    ABS. EOSINOPHILS 0.2 0.0 - 0.8 K/UL    ABS. BASOPHILS 0.0 0.0 - 0.2 K/UL    ABS. IMM. GRANS. 0.0 0.0 - 0.5 K/UL      CXR:  No acute cardiopulmonary abnormality.      Assessment/Plan:   Nausea and vomiting:  Resolved    Heat exhaustion with dehydration:  Resolving with IVF, supportive care     RINKU:  Most likely due to dehydration    Continuing LR   Encourage po intake     Hyponatremia/Hypokalemia/hypomagnesemia:  Replace and recheck at 1800 and in am      Elevated LFTs:  Most likely due to acute illness   Drinks 4 beers daily    NIDDM: Poor control with A1C: 9.4   May need to go back on insulin, will need close follow up   Will need information for [facility] or free clinic    Holding metformin    Hyperglycemia: Change ssi to actid and HS   Consider discharging on insulin     Will need work note to go back to work thursday or [employer] discussed with: Patient and Nurse    Signed By: Gertrude Bernheim, NP     July 21, 2020

## 2020-07-21 NOTE — PROGRESS NOTES
SW reviewed patient's chart and conducted a baseline assessment. Discharge plan at this time is as follows:     Care Management Interventions  PCP Verified by CM: Yes([facility])  Mode of Transport at Discharge: Self  Transition of Care Consult (CM Consult): Discharge Planning  Current Support Network: Own Home  Confirm Follow Up Transport: Family  Discharge Location  Discharge Placement: Home with family assistance      *Please note that discharge plans can change throughout an inpatient admission.  Ensure that you are referring to the most recent social work/nurse case management note for current discharge plan*     Baljit Pantoja, [address] Work   [facility]    * [email or web address]    ( [phone]

## 2020-07-22 VITALS
DIASTOLIC BLOOD PRESSURE: 80 MMHG | SYSTOLIC BLOOD PRESSURE: 129 MMHG | WEIGHT: 229 LBS | RESPIRATION RATE: 16 BRPM | HEART RATE: 61 BPM | TEMPERATURE: 97.5 F | BODY MASS INDEX: 38.15 KG/M2 | HEIGHT: 65 IN | OXYGEN SATURATION: 96 %

## 2020-07-22 LAB
ALBUMIN SERPL-MCNC: 3.4 G/DL (ref 3.5–5)
ALBUMIN/GLOB SERPL: 1 {RATIO} (ref 1.2–3.5)
ALP SERPL-CCNC: 104 U/L (ref 50–136)
ALT SERPL-CCNC: 292 U/L (ref 12–65)
ANION GAP SERPL CALC-SCNC: 2 MMOL/L (ref 7–16)
AST SERPL-CCNC: 108 U/L (ref 15–37)
BASOPHILS # BLD: 0.1 K/UL (ref 0–0.2)
BASOPHILS NFR BLD: 1 % (ref 0–2)
BILIRUB DIRECT SERPL-MCNC: 0.2 MG/DL
BILIRUB SERPL-MCNC: 0.8 MG/DL (ref 0.2–1.1)
BUN SERPL-MCNC: 7 MG/DL (ref 6–23)
CALCIUM SERPL-MCNC: 8.6 MG/DL (ref 8.3–10.4)
CHLORIDE SERPL-SCNC: 103 MMOL/L (ref 98–107)
CO2 SERPL-SCNC: 30 MMOL/L (ref 21–32)
CREAT SERPL-MCNC: 0.88 MG/DL (ref 0.8–1.5)
DIFFERENTIAL METHOD BLD: ABNORMAL
EOSINOPHIL # BLD: 0.2 K/UL (ref 0–0.8)
EOSINOPHIL NFR BLD: 4 % (ref 0.5–7.8)
ERYTHROCYTE [DISTWIDTH] IN BLOOD BY AUTOMATED COUNT: 12.6 % (ref 11.9–14.6)
GLOBULIN SER CALC-MCNC: 3.4 G/DL (ref 2.3–3.5)
GLUCOSE BLD STRIP.AUTO-MCNC: 287 MG/DL (ref 65–100)
GLUCOSE SERPL-MCNC: 229 MG/DL (ref 65–100)
HCT VFR BLD AUTO: 39.8 % (ref 41.1–50.3)
HGB BLD-MCNC: 13.8 G/DL (ref 13.6–17.2)
IMM GRANULOCYTES # BLD AUTO: 0 K/UL (ref 0–0.5)
IMM GRANULOCYTES NFR BLD AUTO: 1 % (ref 0–5)
LYMPHOCYTES # BLD: 1.9 K/UL (ref 0.5–4.6)
LYMPHOCYTES NFR BLD: 32 % (ref 13–44)
MAGNESIUM SERPL-MCNC: 1.6 MG/DL (ref 1.8–2.4)
MCH RBC QN AUTO: 31.9 PG (ref 26.1–32.9)
MCHC RBC AUTO-ENTMCNC: 34.7 G/DL (ref 31.4–35)
MCV RBC AUTO: 91.9 FL (ref 79.6–97.8)
MONOCYTES # BLD: 0.5 K/UL (ref 0.1–1.3)
MONOCYTES NFR BLD: 8 % (ref 4–12)
NEUTS SEG # BLD: 3.3 K/UL (ref 1.7–8.2)
NEUTS SEG NFR BLD: 55 % (ref 43–78)
NRBC # BLD: 0 K/UL (ref 0–0.2)
PLATELET # BLD AUTO: 200 K/UL (ref 150–450)
PMV BLD AUTO: 9.6 FL (ref 9.4–12.3)
POTASSIUM SERPL-SCNC: 3.4 MMOL/L (ref 3.5–5.1)
PROT SERPL-MCNC: 6.8 G/DL (ref 6.3–8.2)
RBC # BLD AUTO: 4.33 M/UL (ref 4.23–5.6)
SODIUM SERPL-SCNC: 135 MMOL/L (ref 136–145)
WBC # BLD AUTO: 6 K/UL (ref 4.3–11.1)

## 2020-07-22 PROCEDURE — 80048 BASIC METABOLIC PNL TOTAL CA: CPT

## 2020-07-22 PROCEDURE — 85025 COMPLETE CBC W/AUTO DIFF WBC: CPT

## 2020-07-22 PROCEDURE — 83735 ASSAY OF MAGNESIUM: CPT

## 2020-07-22 PROCEDURE — 74011636637 HC RX REV CODE- 636/637: Performed by: NURSE PRACTITIONER

## 2020-07-22 PROCEDURE — 80076 HEPATIC FUNCTION PANEL: CPT

## 2020-07-22 PROCEDURE — 74011250636 HC RX REV CODE- 250/636: Performed by: NURSE PRACTITIONER

## 2020-07-22 PROCEDURE — 74011250637 HC RX REV CODE- 250/637: Performed by: NURSE PRACTITIONER

## 2020-07-22 PROCEDURE — 36415 COLL VENOUS BLD VENIPUNCTURE: CPT

## 2020-07-22 PROCEDURE — 74011250636 HC RX REV CODE- 250/636: Performed by: FAMILY MEDICINE

## 2020-07-22 PROCEDURE — 82962 GLUCOSE BLOOD TEST: CPT

## 2020-07-22 RX ORDER — METFORMIN HYDROCHLORIDE 1000 MG/1
1000 TABLET ORAL 2 TIMES DAILY WITH MEALS
Qty: 60 TAB | Refills: 1 | Status: SHIPPED | OUTPATIENT
Start: 2020-07-22 | End: 2020-08-21

## 2020-07-22 RX ORDER — ATORVASTATIN CALCIUM 20 MG/1
20 TABLET, FILM COATED ORAL DAILY
Qty: 30 TAB | Refills: 1 | Status: SHIPPED | OUTPATIENT
Start: 2020-07-22 | End: 2020-08-21

## 2020-07-22 RX ORDER — POTASSIUM CHLORIDE 20 MEQ/1
40 TABLET, EXTENDED RELEASE ORAL
Status: COMPLETED | OUTPATIENT
Start: 2020-07-22 | End: 2020-07-22

## 2020-07-22 RX ORDER — GLIPIZIDE 5 MG/1
5 TABLET ORAL 2 TIMES DAILY
Qty: 60 TAB | Refills: 1 | Status: SHIPPED | OUTPATIENT
Start: 2020-07-22 | End: 2020-08-21

## 2020-07-22 RX ORDER — LISINOPRIL 2.5 MG/1
2.5 TABLET ORAL DAILY
Qty: 30 TAB | Refills: 1 | Status: SHIPPED | OUTPATIENT
Start: 2020-07-22 | End: 2020-08-21

## 2020-07-22 RX ORDER — MAGNESIUM SULFATE HEPTAHYDRATE 40 MG/ML
2 INJECTION, SOLUTION INTRAVENOUS ONCE
Status: COMPLETED | OUTPATIENT
Start: 2020-07-22 | End: 2020-07-22

## 2020-07-22 RX ADMIN — INSULIN LISPRO 6 UNITS: 100 INJECTION, SOLUTION INTRAVENOUS; SUBCUTANEOUS at 08:19

## 2020-07-22 RX ADMIN — MAGNESIUM SULFATE HEPTAHYDRATE 2 G: 40 INJECTION, SOLUTION INTRAVENOUS at 09:44

## 2020-07-22 RX ADMIN — POTASSIUM CHLORIDE 40 MEQ: 1500 TABLET, EXTENDED RELEASE ORAL at 09:44

## 2020-07-22 RX ADMIN — HEPARIN SODIUM 5000 UNITS: 5000 INJECTION INTRAVENOUS; SUBCUTANEOUS at 05:06

## 2020-07-22 NOTE — PROGRESS NOTES
Care Management Interventions  PCP Verified by CM: (set up appointment with [facility] for July 29)  Palliative Care Criteria Met (RRAT>21 & CHF Dx)?: No(Risk 7% Dx RINKU )  Mode of Transport at Discharge: Self  Transition of Care Consult (CM Consult): Discharge Planning  Discharge Durable Medical Equipment: No  Physical Therapy Consult: No  Occupational Therapy Consult: No  Speech Therapy Consult: No  Current Support Network: Relative's Home(lives with his parents)  Confirm Follow Up Transport: Self  Discharge Location  Discharge Placement: Home  Met with patient for d/c planning. Patient is medically stable for d/c. He is alert and oriented x 3, independent of ADL's and lives with his parents. He works at [employer] and incomes approximately $1200/month. He has been seen by [facility] for any assistance he may qualify. He was given information on [facility], [facility], [facility], [employer] application and has appointment set up with [facility] for July 29, 2020. He was encouraged to follow up with education for his diabetes and provided information on [employer] option education. He states hopes to have insurance soon but has not worked at his job long enough to [employer]. Reviewed his medication cost with [facility] and he states he can afford the amount and was given discount card for [facility]. Patient voices understanding of information. He states does have a glucometer but ran out of strips, He voices no concerns or needs for d/c. Patient to d/c home with family.

## 2020-07-22 NOTE — DIABETES MGMT
Patient admitted with acute kidney injury. Admitting blood glucose 279. HbA1c 9.4 (eAG 223). Blood glucose ranged 119-306 yesterday with patient receiving Humalog 10 units, Regular 15 units, and Metfomin 500mg. Blood glucose this morning was 287. Reviewed these numbers and their significance with patient. Offered interpretering services to patient as was told he spoke [place] Patient also speaks [place] and requests educational materials be provided in [place]. Patient politely declined further interpreting services. Patient to discharge home today, spoke with provider discussed patient history and glycemic control, provider plans to discharge patient on oral medications and have patient follow up at [facility]. Patient seen for assessment regarding diabetes management. Patient has a past medical history of DM, HTN. Patient states they were diagnosed 1.5 years ago with diabetes and voices a positive family history of diabetes. Patient states they do have a working glucometer \"a [address] meter\" at home but he ran out of strips 2 days ago. Patient provided with a sample meter with 10 strips and instructed to make sure he goes and gets more of the strips for his meter over the counter. Patient verbalized understanding. Per patient they typically check blood glucose levels 1 times a day in the morning or afternoon. Per patient their blood glucose levels have been running 200s at home. Patient states they are currently taking Metformin BID at home for management of diabetes, but admits that he had been told to hold that medication if his glucose was normal and states he stopped taking it for weeks. Patient has not attended formal diabetes education in the past. Patient reports no difficulty with affording their diabetic supplies and has been educated regarding [employer] insulins and over-the-counter glucometer options. Patient also educated regarding free and $4 list medications at some pharmacies.     Pt given educational material, \"Diabetes Self-Management: A Patient Teaching Guide\", which was reviewed with pt. Explained basic physiology of diabetes, as well as causes, s/s, and treatments for hypoglycemia and hyperglycemia. Patient states he has noticed headaches, weakness, dry mouth, and increased thirst. Described the effects of poor glycemic control on the development of long-term complications such as renal, eye, nerve, and cardiovascular disease. Patient voices a family history of heart disease. Also discussed the importance of good blood pressure control on overall health. Patient eyes got very large when educator described long term complications of diabetes. Described proper diabetic foot care and the importance of checking feet qd. Patient denies numbness and tingling in feet. Per patient they typically drink water, regular gatorade, smoothies. Reviewed the effects of sweetened beverages on glycemic control and alternative beverages to help improve glycemic control. Per patient they typically eat an apple or granola bar for lunch, 2 sandwiches on white stating \"my parents do the shopping\" or that he eats  supper typically is at home and he states his mom makes soups. Patient can state foods with carbs include: bread, rice and pasta. Educated regarding the effects of carbohydrates on blood glucose, the \"plate method\" of healthy meal planning, basics of healthy meal plan, Consistent Carbohydrate Diet, discussed the basics of carb counting and how to read a nutrition label. Discussed tips for eating out with diabetes and the importance of portion control. Patient admits that he had lost weight at the beginning of the year and come off medications stating his A1c was in the 5s but then put weight back on during the quarantine. Discussed sustained weight loss. Educated pt regarding the benefits of physical activity (as directed by provider) on glycemic control.  Also explained the relationship between hyperglycemia and infection and delayed healing. Discussed target goals for blood glucose and A1C. Educated pt regarding diabetic medications including mechanism of action, timing, and possible side effects. Patient verbalizes understanding of teaching. Explained the importance of blood glucose monitoring. Recommended frequency of twice a day blood glucose checks fasting and 2 hours post prandial and to record in log book to take to PCP appointment. Patient is self pay. Patient given educational handout regarding over-the-counter glucometers as a more affordable option. Patient is uninsured but would likely benefit from continued diabetes outpatient classes. Encouraged patient to seek out free classes at [facility]. Patient also given contact information regarding DSME for uninsured patients. Encouraged compliance with discharge regimen. Encouraged patient to continue to work on lifestyle modifications and to follow up with PCP at [facility] for further titration of regimen. Patient verbalized understanding and voices no further questions regarding diabetes management at this time.

## 2020-07-22 NOTE — PROGRESS NOTES
I have reviewed discharge instructions with the patient. The patient verbalized understanding. Discharge medications reviewed with patient and appropriate educational materials and side effects teaching were provided.   Pt discharged home ambulatory with family

## 2020-07-22 NOTE — DISCHARGE SUMMARY
Hospitalist Discharge Summary     Admit Date:  2020  6:26 PM   Name:  Ryan Pierre   Age:  26 y. o.  :  1995   MRN:  [id number]   PCP:  Hang, MD Radha  Treatment Team: Attending Provider: Nato Valenzuela MD; : Lei Agarwal; Utilization Review: Steven Cano RN; Nurse Practitioner: Nella Coulter NP    Problem List for this Hospitalization:  Hospital Problems as of 2020 Never Reviewed          Codes Class Noted - Resolved POA    * (Principal) RINKU (acute kidney injury) ([facility] ICD-10-CM: N17.9  ICD-9-CM: 584.9  2020 - Present Yes        Hyponatremia ICD-10-CM: E87.1  ICD-9-CM: 276.1  2020 - Present Yes        Elevated LFTs ICD-10-CM: R79.89  ICD-9-CM: 790.6  2020 - Present Yes        Hyperglycemia due to type 2 diabetes mellitus ([facility] ICD-10-CM: E11.65  ICD-9-CM: 250.00  2020 - Present Yes                Admission HPI from 2020:    NIDDM patient diagnosed one year ago on metformin 1000 mg bid 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 Unable to retain any fluids. Glucose on arrival 282. Patient reports medication compliance, checks glucose daily, usually in the high 100's. States he was taking insulin for the fist six months, dropping A1C into the 5s. He was then changed to metformin 1000 mg bid. Hospital Course:  A1C 9.4 on . He has remained on SSI, ranging upper 200s. I spoke with diabetic educator who kindly agrees to see patient prior to discharge today. Discharge plan to resume Metformin and add Glipizide. Patient does not have insurance and concerns for noncompliance also given A1C. I am adding low dose Lisinopril and Lipitor. Patient has H/O HTN and lipid panel at [facility]  of last year noted elevated triglycerided 317, , cholesterol 266. Mag today 1.6 and  Potassium 3.4.  I am replacing both prior to discharge. Patient remains obese. CM has made appointment for him at [facility] and discharge instructions given. Follow up instructions and discharge meds at bottom of this note. Plan was discussed with patient, CM,  Diabetic educator. All questions answered. Patient was stable at time of discharge. 10 systems reviewed and negative except as noted in HPI. Diagnostic Imaging/Tests:   Xr Chest Pa Lat    Result Date: 7/21/2020  EXAM: 2 view chest radiograph. INDICATION: Fever. COMPARISON: None. FINDINGS: No focal lung consolidation. No pneumothorax. No pleural effusion. The heart is normal in size. No evidence of acute osseous abnormality. IMPRESSION: 1. No acute cardiopulmonary abnormality. Echocardiogram results:  No results found for this visit on 07/20/20. All Micro Results     Procedure Component Value Units Date/Time    CULTURE, URINE [[id number]] Collected:  07/21/20 1905    Order Status:  Completed Specimen:  Urine from Clean catch Updated:  07/22/20 0858     Special Requests: NO SPECIAL REQUESTS        Culture result:       NO GROWTH AFTER SHORT PERIOD OF INCUBATION. FURTHER RESULTS TO FOLLOW AFTER OVERNIGHT INCUBATION.           CULTURE, BLOOD [[id number]] Collected:  07/21/20 1800    Order Status:  Completed Specimen:  Blood Updated:  07/21/20 1818    CULTURE, BLOOD [[id number]] Collected:  07/21/20 1810    Order Status:  Completed Specimen:  Blood Updated:  07/21/20 1818          Labs: Results:       BMP, Mg, Phos Recent Labs     07/22/20  0500 07/21/20  1800 07/21/20  0431   * 136 135*   K 3.4* 3.2* 3.2*    103 100   CO2 30 28 26   AGAP 2* 5* 9   BUN 7 9 14   CREA 0.88 0.81 1.25   CA 8.6 9.0 8.3   * 119* 260*   MG 1.6* 1.6*  --       CBC Recent Labs     07/22/20  0500 07/21/20  1800 07/21/20  0431   WBC 6.0 5.9 6.5   RBC 4.33 4.86 4.20*   HGB 13.8 15.3 13.0*   HCT 39.8* 43.7 37.7*    226 199   GRANS 55 56 57   LYMPH 32 30 30   EOS 4 4 4 MONOS 8 8 9   BASOS 1 1 1   IG 1 1 1   ANEU 3.3 3.3 3.7   ABL 1.9 1.8 1.9   MANDEEP 0.2 0.2 0.2   ABM 0.5 0.5 0.6   ABB 0.1 0.1 0.0   AIG 0.0 0.0 0.0      LFT Recent Labs     07/22/20  0500 07/21/20  1800 07/20/20  1847   * 327* 391*    97 112   TP 6.8 7.5 8.9*   ALB 3.4* 4.0 4.7   GLOB 3.4 3.5 4.2*   AGRAT 1.0* 1.1* 1.1*      Cardiac Testing No results found for: BNPP, BNP, CPK, RCK1, RCK2, RCK3, RCK4, CKMB, CKNDX, CKND1, TROPT, TROIQ   Coagulation Tests No results found for: PTP, INR, APTT, INREXT   A1c Lab Results   Component Value Date/Time    Hemoglobin A1c 9.4 07/21/2020 04:31 AM      Lipid Panel No results found for: CHOL, CHOLPOCT, CHOLX, CHLST, CHOLV, 575293, HDL, HDLP, LDL, LDLC, DLDLP, 689192, VLDLC, VLDL, TGLX, TRIGL, TRIGP, TGLPOCT, CHHD, CHHDX   Thyroid Panel No results found for: TSH, T4, FT4, TT3, T3U, TSHEXT     Most Recent UA Lab Results   Component Value Date/Time    Color YELLOW 07/21/2020 07:05 PM    Appearance CLEAR 07/21/2020 07:05 PM    Specific gravity 1.010 07/21/2020 07:05 PM    pH (UA) 5.5 07/21/2020 07:05 PM    Protein Negative 07/21/2020 07:05 PM    Glucose Negative 07/21/2020 07:05 PM    Ketone Negative 07/21/2020 07:05 PM    Bilirubin Negative 07/21/2020 07:05 PM    Blood Negative 07/21/2020 07:05 PM    Urobilinogen 1.0 07/21/2020 07:05 PM    Nitrites Negative 07/21/2020 07:05 PM    Leukocyte Esterase Negative 07/21/2020 07:05 PM        No Known Allergies    There is no immunization history on file for this patient.     All Labs from Last 24 Hrs:  Recent Results (from the past 24 hour(s))   GLUCOSE, POC    Collection Time: 07/21/20 11:43 AM   Result Value Ref Range    Glucose (POC) 230 (H) 65 - 100 mg/dL   GLUCOSE, POC    Collection Time: 07/21/20  3:22 PM   Result Value Ref Range    Glucose (POC) 154 (H) 65 - 457 mg/dL   METABOLIC PANEL, COMPREHENSIVE    Collection Time: 07/21/20  6:00 PM   Result Value Ref Range    Sodium 136 136 - 145 mmol/L    Potassium 3.2 (L) 3.5 - 5.1 mmol/L    Chloride 103 98 - 107 mmol/L    CO2 28 21 - 32 mmol/L    Anion gap 5 (L) 7 - 16 mmol/L    Glucose 119 (H) 65 - 100 mg/dL    BUN 9 6 - 23 MG/DL    Creatinine 0.81 0.8 - 1.5 MG/DL    GFR est AA >60 >60 ml/min/1.73m2    GFR est non-AA >60 >60 ml/min/1.73m2    Calcium 9.0 8.3 - 10.4 MG/DL    Bilirubin, total 1.0 0.2 - 1.1 MG/DL    ALT (SGPT) 327 (H) 12 - 65 U/L    AST (SGOT) 119 (H) 15 - 37 U/L    Alk. phosphatase 97 50 - 136 U/L    Protein, total 7.5 6.3 - 8.2 g/dL    Albumin 4.0 3.5 - 5.0 g/dL    Globulin 3.5 2.3 - 3.5 g/dL    A-G Ratio 1.1 (L) 1.2 - 3.5     MAGNESIUM    Collection Time: 07/21/20  6:00 PM   Result Value Ref Range    Magnesium 1.6 (L) 1.8 - 2.4 mg/dL   LACTIC ACID    Collection Time: 07/21/20  6:00 PM   Result Value Ref Range    Lactic acid 0.8 0.4 - 2.0 MMOL/L   CBC WITH AUTOMATED DIFF    Collection Time: 07/21/20  6:00 PM   Result Value Ref Range    WBC 5.9 4.3 - 11.1 K/uL    RBC 4.86 4.23 - 5.6 M/uL    HGB 15.3 13.6 - 17.2 g/dL    HCT 43.7 41.1 - 50.3 %    MCV 89.9 79.6 - 97.8 FL    MCH 31.5 26.1 - 32.9 PG    MCHC 35.0 31.4 - 35.0 g/dL    RDW 12.6 11.9 - 14.6 %    PLATELET 678 267 - 294 K/uL    MPV 9.7 9.4 - 12.3 FL    ABSOLUTE NRBC 0.00 0.0 - 0.2 K/uL    DF AUTOMATED      NEUTROPHILS 56 43 - 78 %    LYMPHOCYTES 30 13 - 44 %    MONOCYTES 8 4.0 - 12.0 %    EOSINOPHILS 4 0.5 - 7.8 %    BASOPHILS 1 0.0 - 2.0 %    IMMATURE GRANULOCYTES 1 0.0 - 5.0 %    ABS. NEUTROPHILS 3.3 1.7 - 8.2 K/UL    ABS. LYMPHOCYTES 1.8 0.5 - 4.6 K/UL    ABS. MONOCYTES 0.5 0.1 - 1.3 K/UL    ABS. EOSINOPHILS 0.2 0.0 - 0.8 K/UL    ABS. BASOPHILS 0.1 0.0 - 0.2 K/UL    ABS. IMM.  GRANS. 0.0 0.0 - 0.5 K/UL   URINALYSIS W/ RFLX MICROSCOPIC    Collection Time: 07/21/20  7:05 PM   Result Value Ref Range    Color YELLOW      Appearance CLEAR      Specific gravity 1.010 1.001 - 1.023      pH (UA) 5.5 5.0 - 9.0      Protein Negative NEG mg/dL    Glucose Negative mg/dL    Ketone Negative NEG mg/dL    Bilirubin Negative NEG Blood Negative NEG      Urobilinogen 1.0 0.2 - 1.0 EU/dL    Nitrites Negative NEG      Leukocyte Esterase Negative NEG     CULTURE, URINE    Collection Time: 07/21/20  7:05 PM    Specimen: Clean catch; Urine    URINE   Result Value Ref Range    Special Requests: NO SPECIAL REQUESTS      Culture result:        NO GROWTH AFTER SHORT PERIOD OF INCUBATION. FURTHER RESULTS TO FOLLOW AFTER OVERNIGHT INCUBATION. GLUCOSE, POC    Collection Time: 07/21/20  9:00 PM   Result Value Ref Range    Glucose (POC) 306 (H) 65 - 493 mg/dL   METABOLIC PANEL, BASIC    Collection Time: 07/22/20  5:00 AM   Result Value Ref Range    Sodium 135 (L) 136 - 145 mmol/L    Potassium 3.4 (L) 3.5 - 5.1 mmol/L    Chloride 103 98 - 107 mmol/L    CO2 30 21 - 32 mmol/L    Anion gap 2 (L) 7 - 16 mmol/L    Glucose 229 (H) 65 - 100 mg/dL    BUN 7 6 - 23 MG/DL    Creatinine 0.88 0.8 - 1.5 MG/DL    GFR est AA >60 >60 ml/min/1.73m2    GFR est non-AA >60 >60 ml/min/1.73m2    Calcium 8.6 8.3 - 10.4 MG/DL   CBC WITH AUTOMATED DIFF    Collection Time: 07/22/20  5:00 AM   Result Value Ref Range    WBC 6.0 4.3 - 11.1 K/uL    RBC 4.33 4.23 - 5.6 M/uL    HGB 13.8 13.6 - 17.2 g/dL    HCT 39.8 (L) 41.1 - 50.3 %    MCV 91.9 79.6 - 97.8 FL    MCH 31.9 26.1 - 32.9 PG    MCHC 34.7 31.4 - 35.0 g/dL    RDW 12.6 11.9 - 14.6 %    PLATELET 702 190 - 859 K/uL    MPV 9.6 9.4 - 12.3 FL    ABSOLUTE NRBC 0.00 0.0 - 0.2 K/uL    DF AUTOMATED      NEUTROPHILS 55 43 - 78 %    LYMPHOCYTES 32 13 - 44 %    MONOCYTES 8 4.0 - 12.0 %    EOSINOPHILS 4 0.5 - 7.8 %    BASOPHILS 1 0.0 - 2.0 %    IMMATURE GRANULOCYTES 1 0.0 - 5.0 %    ABS. NEUTROPHILS 3.3 1.7 - 8.2 K/UL    ABS. LYMPHOCYTES 1.9 0.5 - 4.6 K/UL    ABS. MONOCYTES 0.5 0.1 - 1.3 K/UL    ABS. EOSINOPHILS 0.2 0.0 - 0.8 K/UL    ABS. BASOPHILS 0.1 0.0 - 0.2 K/UL    ABS. IMM.  GRANS. 0.0 0.0 - 0.5 K/UL   MAGNESIUM    Collection Time: 07/22/20  5:00 AM   Result Value Ref Range    Magnesium 1.6 (L) 1.8 - 2.4 mg/dL   HEPATIC FUNCTION PANEL Collection Time: 07/22/20  5:00 AM   Result Value Ref Range    Protein, total 6.8 6.3 - 8.2 g/dL    Albumin 3.4 (L) 3.5 - 5.0 g/dL    Globulin 3.4 2.3 - 3.5 g/dL    A-G Ratio 1.0 (L) 1.2 - 3.5      Bilirubin, total 0.8 0.2 - 1.1 MG/DL    Bilirubin, direct 0.2 <0.4 MG/DL    Alk. phosphatase 104 50 - 136 U/L    AST (SGOT) 108 (H) 15 - 37 U/L    ALT (SGPT) 292 (H) 12 - 65 U/L   GLUCOSE, POC    Collection Time: 07/22/20  7:00 AM   Result Value Ref Range    Glucose (POC) 287 (H) 65 - 100 mg/dL       Discharge Exam:  Patient Vitals for the past 24 hrs:   Temp Pulse Resp BP SpO2   07/22/20 0749 97.5 °F (36.4 °C) 61 16 129/80 96 %   07/22/20 0301 99.1 °F (37.3 °C) 63 17 130/85 95 %   07/21/20 2347 98.9 °F (37.2 °C) 67 18 133/79 99 %   07/21/20 2000 98.4 °F (36.9 °C) 72 17 128/72 97 %   07/21/20 1850 98.2 °F (36.8 °C)       07/21/20 1518 (!) 100.8 °F (38.2 °C) 74 18 127/79 99 %     Oxygen Therapy  O2 Sat (%): 96 % (07/22/20 0749)  Pulse via Oximetry: 68 beats per minute (07/21/20 0634)  O2 Device: Room air (07/20/20 1830)    Intake/Output Summary (Last 24 hours) at 7/22/2020 0914  Last data filed at 7/22/2020 0302  Gross per 24 hour   Intake 500 ml   Output    Net 500 ml         Physical exam:  General:    Well nourished. Alert. No distress. Eyes:   Normal sclera. Extraocular movements intact. ENT:  Normocephalic, atraumatic. Moist mucous membranes  CV:   Regular rate and rhythm. No murmur, rub, or gallop. Lungs:  Clear to auscultation bilaterally. No wheezing, rhonchi, or rales. Abdomen: Soft, nontender, nondistended. Bowel sounds normal.   Extremities: Warm and dry. No cyanosis or edema. Neurologic: No focal deficits  Skin:     No rashes or jaundice. Psych:  Normal mood and affect. Discharge Info:   Current Discharge Medication List      START taking these medications    Details   glipiZIDE (GLUCOTROL) 5 mg tablet Take 1 Tab by mouth two (2) times a day for 30 days.   Qty: 60 Tab, Refills: 1 lisinopriL (PRINIVIL, ZESTRIL) 2.5 mg tablet Take 1 Tab by mouth daily for 30 days. Qty: 30 Tab, Refills: 1      atorvastatin (LIPITOR) 20 mg tablet Take 1 Tab by mouth daily for 30 days. Qty: 30 Tab, Refills: 1         CONTINUE these medications which have CHANGED    Details   metFORMIN (GLUCOPHAGE) 1,000 mg tablet Take 1 Tab by mouth two (2) times daily (with meals) for 30 days. Qty: 60 Tab, Refills: 1               Disposition: home    Activity: Activity as tolerated  Diet: DIET DIABETIC CONSISTENT CARB Regular    Follow-up Appointments   Procedures    FOLLOW UP VISIT Appointment in: [facility]     Standing Status:   Standing     Number of Occurrences:   1     Order Specific Question:   Appointment in     Answer: One Week         Follow-up Information     Follow up With Specialties Details Why Contact Info    Other, MD Radha    Patient can only remember the practice name and not the physician            Case reviewed with supervising physician - Dr. Jethro Gilbert    Time spent in patient discharge planning and coordination 35 minutes.     Signed:  Samanta Goodwin NP

## 2020-07-22 NOTE — PROGRESS NOTES
present at bedside via phone conference during diabetes education with nurse MARILIA [address] Department  [facility] [address] [place] [address]  [phone] (phone)   [email or web address]

## 2020-07-23 ENCOUNTER — PATIENT OUTREACH (OUTPATIENT)
Dept: CASE MANAGEMENT | Age: 25
End: 2020-07-23

## 2020-07-23 NOTE — PROGRESS NOTES
1st Attempt to contact patient for [facility] Covid 19  call, no answer on home number .  Will attempt to contact patient again within 24 hours

## 2020-07-24 ENCOUNTER — PATIENT OUTREACH (OUTPATIENT)
Dept: CASE MANAGEMENT | Age: 25
End: 2020-07-24

## 2020-07-24 LAB
BACTERIA SPEC CULT: NORMAL
SERVICE CMNT-IMP: NORMAL

## 2020-07-24 NOTE — PROGRESS NOTES
2nd  attempt to contact patient for [facility] Covid 19 education Call. No answer, on home number . Episode will be closed at this time as [email or web address] has been unsuccessful in reaching the patient.  CC will be removed from the care team.

## 2020-07-26 LAB
BACTERIA SPEC CULT: NORMAL
BACTERIA SPEC CULT: NORMAL
SERVICE CMNT-IMP: NORMAL
SERVICE CMNT-IMP: NORMAL

## 2021-05-15 ENCOUNTER — HOSPITAL ENCOUNTER (EMERGENCY)
Age: 26
Discharge: HOME | End: 2021-05-15
Attending: EMERGENCY MEDICINE

## 2021-05-15 VITALS
OXYGEN SATURATION: 98 % | HEIGHT: 64 IN | RESPIRATION RATE: 18 BRPM | HEART RATE: 85 BPM | BODY MASS INDEX: 36.7 KG/M2 | TEMPERATURE: 99 F | DIASTOLIC BLOOD PRESSURE: 80 MMHG | WEIGHT: 215 LBS | SYSTOLIC BLOOD PRESSURE: 149 MMHG

## 2021-05-15 DIAGNOSIS — R11.2 NAUSEA AND VOMITING, INTRACTABILITY OF VOMITING NOT SPECIFIED, UNSPECIFIED VOMITING TYPE: Primary | ICD-10-CM

## 2021-05-15 LAB
ALBUMIN SERPL-MCNC: 5.4 G/DL (ref 3.5–5)
ALBUMIN/GLOB SERPL: 1.4 {RATIO} (ref 1.2–3.5)
ALP SERPL-CCNC: 127 U/L (ref 50–136)
ALT SERPL-CCNC: 187 U/L (ref 12–65)
ANION GAP SERPL CALC-SCNC: 11 MMOL/L (ref 7–16)
AST SERPL-CCNC: 89 U/L (ref 15–37)
BACTERIA URNS QL MICRO: NORMAL /HPF
BASOPHILS # BLD: 0.1 K/UL (ref 0–0.2)
BASOPHILS NFR BLD: 1 % (ref 0–2)
BILIRUB SERPL-MCNC: 0.9 MG/DL (ref 0.2–1.1)
BUN SERPL-MCNC: 16 MG/DL (ref 6–23)
CALCIUM SERPL-MCNC: 9.4 MG/DL (ref 8.3–10.4)
CASTS URNS QL MICRO: 0 /LPF
CHLORIDE SERPL-SCNC: 102 MMOL/L (ref 98–107)
CK SERPL-CCNC: 345 U/L (ref 21–215)
CO2 SERPL-SCNC: 26 MMOL/L (ref 21–32)
CREAT SERPL-MCNC: 1.18 MG/DL (ref 0.8–1.5)
CRYSTALS URNS QL MICRO: 0 /LPF
DIFFERENTIAL METHOD BLD: ABNORMAL
EOSINOPHIL # BLD: 0.1 K/UL (ref 0–0.8)
EOSINOPHIL NFR BLD: 1 % (ref 0.5–7.8)
EPI CELLS #/AREA URNS HPF: NORMAL /HPF
ERYTHROCYTE [DISTWIDTH] IN BLOOD BY AUTOMATED COUNT: 12.1 % (ref 11.9–14.6)
GLOBULIN SER CALC-MCNC: 3.9 G/DL (ref 2.3–3.5)
GLUCOSE BLD STRIP.AUTO-MCNC: 224 MG/DL (ref 65–100)
GLUCOSE SERPL-MCNC: 217 MG/DL (ref 65–100)
HCT VFR BLD AUTO: 45.7 % (ref 41.1–50.3)
HGB BLD-MCNC: 16.4 G/DL (ref 13.6–17.2)
IMM GRANULOCYTES # BLD AUTO: 0 K/UL (ref 0–0.5)
IMM GRANULOCYTES NFR BLD AUTO: 0 % (ref 0–5)
LIPASE SERPL-CCNC: 127 U/L (ref 73–393)
LYMPHOCYTES # BLD: 2.2 K/UL (ref 0.5–4.6)
LYMPHOCYTES NFR BLD: 24 % (ref 13–44)
MAGNESIUM SERPL-MCNC: 1.7 MG/DL (ref 1.8–2.4)
MCH RBC QN AUTO: 32.7 PG (ref 26.1–32.9)
MCHC RBC AUTO-ENTMCNC: 35.9 G/DL (ref 31.4–35)
MCV RBC AUTO: 91.2 FL (ref 79.6–97.8)
MONOCYTES # BLD: 0.5 K/UL (ref 0.1–1.3)
MONOCYTES NFR BLD: 5 % (ref 4–12)
MUCOUS THREADS URNS QL MICRO: 0 /LPF
NEUTS SEG # BLD: 6.4 K/UL (ref 1.7–8.2)
NEUTS SEG NFR BLD: 70 % (ref 43–78)
NRBC # BLD: 0 K/UL (ref 0–0.2)
PLATELET # BLD AUTO: 255 K/UL (ref 150–450)
PMV BLD AUTO: 9.2 FL (ref 9.4–12.3)
POTASSIUM SERPL-SCNC: 3.6 MMOL/L (ref 3.5–5.1)
PROT SERPL-MCNC: 9.3 G/DL (ref 6.3–8.2)
RBC # BLD AUTO: 5.01 M/UL (ref 4.23–5.6)
RBC #/AREA URNS HPF: NORMAL /HPF
SERVICE CMNT-IMP: ABNORMAL
SODIUM SERPL-SCNC: 139 MMOL/L (ref 136–145)
WBC # BLD AUTO: 9.2 K/UL (ref 4.3–11.1)
WBC URNS QL MICRO: NORMAL /HPF

## 2021-05-15 PROCEDURE — 96366 THER/PROPH/DIAG IV INF ADDON: CPT

## 2021-05-15 PROCEDURE — 81015 MICROSCOPIC EXAM OF URINE: CPT

## 2021-05-15 PROCEDURE — 74011250636 HC RX REV CODE- 250/636: Performed by: EMERGENCY MEDICINE

## 2021-05-15 PROCEDURE — 83690 ASSAY OF LIPASE: CPT

## 2021-05-15 PROCEDURE — 99284 EMERGENCY DEPT VISIT MOD MDM: CPT

## 2021-05-15 PROCEDURE — 83735 ASSAY OF MAGNESIUM: CPT

## 2021-05-15 PROCEDURE — 85025 COMPLETE CBC W/AUTO DIFF WBC: CPT

## 2021-05-15 PROCEDURE — 80053 COMPREHEN METABOLIC PANEL: CPT

## 2021-05-15 PROCEDURE — 82550 ASSAY OF CK (CPK): CPT

## 2021-05-15 PROCEDURE — 82962 GLUCOSE BLOOD TEST: CPT

## 2021-05-15 PROCEDURE — 96375 TX/PRO/DX INJ NEW DRUG ADDON: CPT

## 2021-05-15 PROCEDURE — 96365 THER/PROPH/DIAG IV INF INIT: CPT

## 2021-05-15 RX ORDER — ONDANSETRON 2 MG/ML
4 INJECTION INTRAMUSCULAR; INTRAVENOUS
Status: COMPLETED | OUTPATIENT
Start: 2021-05-15 | End: 2021-05-15

## 2021-05-15 RX ORDER — SODIUM CHLORIDE 0.9 % (FLUSH) 0.9 %
5-10 SYRINGE (ML) INJECTION AS NEEDED
Status: DISCONTINUED | OUTPATIENT
Start: 2021-05-15 | End: 2021-05-16 | Stop reason: HOSPADM

## 2021-05-15 RX ORDER — MAGNESIUM SULFATE HEPTAHYDRATE 40 MG/ML
2 INJECTION, SOLUTION INTRAVENOUS
Status: COMPLETED | OUTPATIENT
Start: 2021-05-15 | End: 2021-05-15

## 2021-05-15 RX ORDER — ONDANSETRON 4 MG/1
4 TABLET, ORALLY DISINTEGRATING ORAL
Qty: 8 TAB | Refills: 0 | Status: SHIPPED | OUTPATIENT
Start: 2021-05-15

## 2021-05-15 RX ORDER — SODIUM CHLORIDE 0.9 % (FLUSH) 0.9 %
5-10 SYRINGE (ML) INJECTION EVERY 8 HOURS
Status: DISCONTINUED | OUTPATIENT
Start: 2021-05-15 | End: 2021-05-16 | Stop reason: HOSPADM

## 2021-05-15 RX ADMIN — ONDANSETRON 4 MG: 2 INJECTION INTRAMUSCULAR; INTRAVENOUS at 20:14

## 2021-05-15 RX ADMIN — MAGNESIUM SULFATE HEPTAHYDRATE 2 G: 40 INJECTION, SOLUTION INTRAVENOUS at 20:15

## 2021-05-15 RX ADMIN — SODIUM CHLORIDE 1000 ML: 900 INJECTION, SOLUTION INTRAVENOUS at 20:15

## 2021-05-15 NOTE — ED TRIAGE NOTES
Arrives with face mask in place. Reports n/v/d, onset earlier today. States unable to keep anything down. Denies abdominal pain, fever/chills, urinary pain. Reports dark colored urine. Hx of same, admitted here 7/2020 for RINKU. HX DM, reports able to keep metformin down.    in triage

## 2021-05-15 NOTE — ED PROVIDER NOTES
59-year-old male with history of alcohol abuse, type 2 diabetes mellitus, hypertension who presents with complaint of nausea, vomiting, diarrhea that started earlier today. Patient states he has been unable to keep anything down over the past 24 hours. Patient reports that he is noticed dark-colored urine. Patient states that he works as a . States that he drinks around 2 40 ounce beers daily and drank last night. Denies abdominal pain, illicit drug use, chest pain, shortness of breath, fever, chills, dysuria, flank pain, dizziness, weakness, syncope, headache. Denies any recent sick contacts. States  in triage. Patient states he was unable to keep Metformin down today. States that he has been working as a  in the heat today. The history is provided by the patient. No  was used. Vomiting   This is a new problem. The current episode started 6 to 12 hours ago. The problem occurs 5 to 10 times per day. The problem has not changed since onset. The emesis has an appearance of stomach contents. There has been no fever. Associated symptoms include diarrhea. Pertinent negatives include no chills, no fever, no sweats, no abdominal pain, no headaches, no arthralgias, no myalgias, no cough, no URI and no headaches. Past Medical History:   Diagnosis Date    Diabetes ([address]     type 2. Takes metformin    Hypertension        No past surgical history on file. No family history on file.     Social History     Socioeconomic History    Marital status: SINGLE     Spouse name: Not on file    Number of children: Not on file    Years of education: Not on file    Highest education level: Not on file   Occupational History    Not on file   Social Needs    Financial resource strain: Not on file    Food insecurity     Worry: Not on file     Inability: Not on file    Transportation needs     Medical: Not on file     Non-medical: Not on file   Tobacco Use    Smoking status: Never Smoker    Smokeless tobacco: Never Used   Substance and Sexual Activity    Alcohol use: Yes     Alcohol/week: 24.0 standard drinks     Types: 24 Cans of beer per week    Drug use: Not Currently    Sexual activity: Not on file   Lifestyle    Physical activity     Days per week: Not on file     Minutes per session: Not on file    Stress: Not on file   Relationships    Social connections     Talks on phone: Not on file     Gets together: Not on file     Attends Buddhism service: Not on file     Active member of club or organization: Not on file     Attends meetings of clubs or organizations: Not on file     Relationship status: Not on file    Intimate partner violence     Fear of current or ex partner: Not on file     Emotionally abused: Not on file     Physically abused: Not on file     Forced sexual activity: Not on file   Other Topics Concern    Not on file   Social History Narrative    Not on file         ALLERGIES: Patient has no known allergies. Review of Systems   Constitutional: Negative for chills, diaphoresis and fever. HENT: Negative for congestion and rhinorrhea. Eyes: Negative for visual disturbance. Respiratory: Negative for cough and shortness of breath. Cardiovascular: Negative for chest pain and palpitations. Gastrointestinal: Positive for diarrhea, nausea and vomiting. Negative for abdominal pain, blood in stool and constipation. Genitourinary: Negative for decreased urine volume, dysuria, flank pain and hematuria. Musculoskeletal: Negative for arthralgias, gait problem and myalgias. Skin: Negative for color change and rash. Neurological: Negative for dizziness, syncope, weakness, light-headedness and headaches. Vitals:    05/15/21 1917   BP: (!) 153/80   Pulse: (!) 103   Resp: 18   Temp: 99 °F (37.2 °C)   SpO2: 99%   Weight: 97.5 kg (215 lb)   Height: 5' 4\" (1.626 m)            Physical Exam  Vitals signs and nursing note reviewed. Constitutional:       Appearance: Normal appearance. Comments: Patient in no acute distress. HENT:      Head: Normocephalic. Nose: Nose normal.      Mouth/Throat:      Mouth: Mucous membranes are moist.   Eyes:      Extraocular Movements: Extraocular movements intact. Pupils: Pupils are equal, round, and reactive to light. Cardiovascular:      Rate and Rhythm: Regular rhythm. Tachycardia present. Pulses: Normal pulses. Heart sounds: Normal heart sounds. Pulmonary:      Effort: Pulmonary effort is normal.      Breath sounds: Normal breath sounds. Comments: CTAB. Abdominal:      General: Bowel sounds are normal.      Palpations: Abdomen is soft. Tenderness: There is no abdominal tenderness. There is no guarding or rebound. Comments: Soft, nontender, nondistended. No rebound or guarding. No epigastric or right upper quadrant tenderness. Negative Rowe's sign. No CVA tenderness. Musculoskeletal: Normal range of motion. Skin:     General: Skin is warm. Findings: No erythema or rash. Neurological:      General: No focal deficit present. Mental Status: He is alert and oriented to person, place, and time. Motor: No weakness. MDM  Number of Diagnoses or Management Options  Nausea and vomiting, intractability of vomiting not specified, unspecified vomiting type: new and requires workup  Diagnosis management comments: Slightly tachycardic on arrival.  Remainder vital signs stable. . Normal CO2. No elevated anion gap. No leukocytosis. Normal lipase. Slight elevation in AST, ALT. Most recent previous creatinine for comparison 0.88 on 7/22/20. Today 1. 18. Mag 1.7. Will replete w/ Mag 2 g IV. 1L NS IVF  Bolus + Zofran 4 mg IV ordered. Soft, nontender with no rebound or guarding. No indication for imaging at this time. UA negative for UTI. Patient tolerating p.o.   Will discharge home with Zofran and instructions to continue taking her medications and to follow-up with PCP. Patient given return precautions. Amount and/or Complexity of Data Reviewed  Clinical lab tests: ordered and reviewed  Tests in the radiology section of CPT®: ordered and reviewed  Tests in the medicine section of CPT®: ordered and reviewed  Review and summarize past medical records: yes  Independent visualization of images, tracings, or specimens: yes    Risk of Complications, Morbidity, and/or Mortality  Presenting problems: moderate  Diagnostic procedures: moderate  Management options: moderate    Patient Progress  Patient progress: stable    ED Course as of May 15 2110   Sat May 15, 2021   2108 GLUCOSE,FAST - POC(!): 224 [DF]   2110 Magnesium(!): 1.7 [DF]   2110 Creatinine: 1.18 [DF]   2110 AST(!): 89 [DF]   2110 ALT(!): 187 [DF]      ED Course User Index  [DF] Deidre Avery MD       Procedures  Results Include:    Recent Results (from the past 24 hour(s))   GLUCOSE, POC    Collection Time: 05/15/21  7:19 PM   Result Value Ref Range    Glucose (POC) 224 (H) 65 - 100 mg/dL    Performed by Kumar    CBC WITH AUTOMATED DIFF    Collection Time: 05/15/21  7:20 PM   Result Value Ref Range    WBC 9.2 4.3 - 11.1 K/uL    RBC 5.01 4.23 - 5.6 M/uL    HGB 16.4 13.6 - 17.2 g/dL    HCT 45.7 41.1 - 50.3 %    MCV 91.2 79.6 - 97.8 FL    MCH 32.7 26.1 - 32.9 PG    MCHC 35.9 (H) 31.4 - 35.0 g/dL    RDW 12.1 11.9 - 14.6 %    PLATELET 238 291 - 050 K/uL    MPV 9.2 (L) 9.4 - 12.3 FL    ABSOLUTE NRBC 0.00 0.0 - 0.2 K/uL    DF AUTOMATED      NEUTROPHILS 70 43 - 78 %    LYMPHOCYTES 24 13 - 44 %    MONOCYTES 5 4.0 - 12.0 %    EOSINOPHILS 1 0.5 - 7.8 %    BASOPHILS 1 0.0 - 2.0 %    IMMATURE GRANULOCYTES 0 0.0 - 5.0 %    ABS. NEUTROPHILS 6.4 1.7 - 8.2 K/UL    ABS. LYMPHOCYTES 2.2 0.5 - 4.6 K/UL    ABS. MONOCYTES 0.5 0.1 - 1.3 K/UL    ABS. EOSINOPHILS 0.1 0.0 - 0.8 K/UL    ABS. BASOPHILS 0.1 0.0 - 0.2 K/UL    ABS. IMM.  GRANS. 0.0 0.0 - 0.5 K/UL   METABOLIC PANEL, COMPREHENSIVE    Collection Time: 05/15/21  7:20 PM   Result Value Ref Range    Sodium 139 136 - 145 mmol/L    Potassium 3.6 3.5 - 5.1 mmol/L    Chloride 102 98 - 107 mmol/L    CO2 26 21 - 32 mmol/L    Anion gap 11 7 - 16 mmol/L    Glucose 217 (H) 65 - 100 mg/dL    BUN 16 6 - 23 MG/DL    Creatinine 1.18 0.8 - 1.5 MG/DL    GFR est AA >60 >60 ml/min/1.73m2    GFR est non-AA >60 >60 ml/min/1.73m2    Calcium 9.4 8.3 - 10.4 MG/DL    Bilirubin, total 0.9 0.2 - 1.1 MG/DL    ALT (SGPT) 187 (H) 12 - 65 U/L    AST (SGOT) 89 (H) 15 - 37 U/L    Alk. phosphatase 127 50 - 136 U/L    Protein, total 9.3 (H) 6.3 - 8.2 g/dL    Albumin 5.4 (H) 3.5 - 5.0 g/dL    Globulin 3.9 (H) 2.3 - 3.5 g/dL    A-G Ratio 1.4 1.2 - 3.5     MAGNESIUM    Collection Time: 05/15/21  7:20 PM   Result Value Ref Range    Magnesium 1.7 (L) 1.8 - 2.4 mg/dL   LIPASE    Collection Time: 05/15/21  7:20 PM   Result Value Ref Range    Lipase 127 73 - 393 U/L                    Zaki Kumari MD; 5/15/2021 @7:26 PM Voice dictation software was used during the making of this note. This software is not perfect and grammatical and other typographical errors may be present.   This note has not been proofread for errors.  ===================================================================

## 2021-05-16 NOTE — ED NOTES
I have reviewed discharge instructions with the patient. The patient verbalized understanding. Patient left ED via Discharge Method: ambulatory to Home with family. Opportunity for questions and clarification provided. Patient given 1 scripts. To continue your aftercare when you leave the hospital, you may receive an automated call from our care team to check in on how you are doing. This is a free service and part of our promise to provide the best care and service to meet your aftercare needs.  If you have questions, or wish to unsubscribe from this service please call [phone]. Thank you for Choosing our [address] [place] [address] Emergency Department.

## 2021-05-16 NOTE — DISCHARGE INSTRUCTIONS
Drink plenty of fluids remain hydrated. Take Zofran as directed. Remain compliant with home medications. Follow with PCP, [facility]. Return to ED if symptoms worsen or progress in any way.

## 2021-06-03 ENCOUNTER — HOSPITAL ENCOUNTER (EMERGENCY)
Age: 26
Discharge: HOME | End: 2021-06-04
Attending: EMERGENCY MEDICINE
Payer: COMMERCIAL

## 2021-06-03 DIAGNOSIS — R73.9 HYPERGLYCEMIA: Primary | ICD-10-CM

## 2021-06-03 LAB
ALBUMIN SERPL-MCNC: 4.2 G/DL (ref 3.5–5)
ALBUMIN/GLOB SERPL: 1.4 {RATIO} (ref 1.2–3.5)
ALP SERPL-CCNC: 114 U/L (ref 50–136)
ALT SERPL-CCNC: 154 U/L (ref 12–65)
ANION GAP SERPL CALC-SCNC: 15 MMOL/L (ref 7–16)
AST SERPL-CCNC: 78 U/L (ref 15–37)
BASOPHILS # BLD: 0 K/UL (ref 0–0.2)
BASOPHILS NFR BLD: 1 % (ref 0–2)
BILIRUB SERPL-MCNC: 0.8 MG/DL (ref 0.2–1.1)
BUN SERPL-MCNC: 11 MG/DL (ref 6–23)
CALCIUM SERPL-MCNC: 8.6 MG/DL (ref 8.3–10.4)
CHLORIDE SERPL-SCNC: 96 MMOL/L (ref 98–107)
CO2 SERPL-SCNC: 25 MMOL/L (ref 21–32)
CREAT SERPL-MCNC: 0.85 MG/DL (ref 0.8–1.5)
DIFFERENTIAL METHOD BLD: ABNORMAL
EOSINOPHIL # BLD: 0.2 K/UL (ref 0–0.8)
EOSINOPHIL NFR BLD: 3 % (ref 0.5–7.8)
ERYTHROCYTE [DISTWIDTH] IN BLOOD BY AUTOMATED COUNT: 11.9 % (ref 11.9–14.6)
GLOBULIN SER CALC-MCNC: 2.9 G/DL (ref 2.3–3.5)
GLUCOSE BLD STRIP.AUTO-MCNC: 285 MG/DL (ref 65–100)
GLUCOSE SERPL-MCNC: 294 MG/DL (ref 65–100)
HCT VFR BLD AUTO: 42.4 % (ref 41.1–50.3)
HGB BLD-MCNC: 15.4 G/DL (ref 13.6–17.2)
IMM GRANULOCYTES # BLD AUTO: 0 K/UL (ref 0–0.5)
IMM GRANULOCYTES NFR BLD AUTO: 0 % (ref 0–5)
LYMPHOCYTES # BLD: 2.1 K/UL (ref 0.5–4.6)
LYMPHOCYTES NFR BLD: 41 % (ref 13–44)
MCH RBC QN AUTO: 32.8 PG (ref 26.1–32.9)
MCHC RBC AUTO-ENTMCNC: 36.3 G/DL (ref 31.4–35)
MCV RBC AUTO: 90.2 FL (ref 79.6–97.8)
MONOCYTES # BLD: 0.4 K/UL (ref 0.1–1.3)
MONOCYTES NFR BLD: 7 % (ref 4–12)
NEUTS SEG # BLD: 2.5 K/UL (ref 1.7–8.2)
NEUTS SEG NFR BLD: 48 % (ref 43–78)
NRBC # BLD: 0 K/UL (ref 0–0.2)
PLATELET # BLD AUTO: 230 K/UL (ref 150–450)
PMV BLD AUTO: 10.2 FL (ref 9.4–12.3)
POTASSIUM SERPL-SCNC: 4.2 MMOL/L (ref 3.5–5.1)
PROT SERPL-MCNC: 7.1 G/DL (ref 6.3–8.2)
RBC # BLD AUTO: 4.7 M/UL (ref 4.23–5.6)
SERVICE CMNT-IMP: ABNORMAL
SODIUM SERPL-SCNC: 136 MMOL/L (ref 136–145)
WBC # BLD AUTO: 5.2 K/UL (ref 4.3–11.1)

## 2021-06-03 PROCEDURE — 74011250636 HC RX REV CODE- 250/636: Performed by: EMERGENCY MEDICINE

## 2021-06-03 PROCEDURE — 96374 THER/PROPH/DIAG INJ IV PUSH: CPT

## 2021-06-03 PROCEDURE — 81003 URINALYSIS AUTO W/O SCOPE: CPT

## 2021-06-03 PROCEDURE — 96360 HYDRATION IV INFUSION INIT: CPT

## 2021-06-03 PROCEDURE — 82962 GLUCOSE BLOOD TEST: CPT

## 2021-06-03 PROCEDURE — 96361 HYDRATE IV INFUSION ADD-ON: CPT

## 2021-06-03 PROCEDURE — 99284 EMERGENCY DEPT VISIT MOD MDM: CPT

## 2021-06-03 PROCEDURE — 74011636637 HC RX REV CODE- 636/637: Performed by: EMERGENCY MEDICINE

## 2021-06-03 PROCEDURE — 80053 COMPREHEN METABOLIC PANEL: CPT

## 2021-06-03 PROCEDURE — 85025 COMPLETE CBC W/AUTO DIFF WBC: CPT

## 2021-06-03 RX ORDER — SODIUM CHLORIDE 0.9 % (FLUSH) 0.9 %
5-10 SYRINGE (ML) INJECTION EVERY 8 HOURS
Status: DISCONTINUED | OUTPATIENT
Start: 2021-06-03 | End: 2021-06-04 | Stop reason: HOSPADM

## 2021-06-03 RX ORDER — METFORMIN HYDROCHLORIDE 500 MG/1
1000 TABLET ORAL 2 TIMES DAILY WITH MEALS
COMMUNITY

## 2021-06-03 RX ORDER — SODIUM CHLORIDE 0.9 % (FLUSH) 0.9 %
5-10 SYRINGE (ML) INJECTION AS NEEDED
Status: DISCONTINUED | OUTPATIENT
Start: 2021-06-03 | End: 2021-06-04 | Stop reason: HOSPADM

## 2021-06-03 RX ORDER — SODIUM CHLORIDE 9 MG/ML
1000 INJECTION, SOLUTION INTRAVENOUS ONCE
Status: COMPLETED | OUTPATIENT
Start: 2021-06-03 | End: 2021-06-04

## 2021-06-03 RX ADMIN — SODIUM CHLORIDE 1000 ML: 900 INJECTION, SOLUTION INTRAVENOUS at 23:54

## 2021-06-03 RX ADMIN — INSULIN HUMAN 5 UNITS: 100 INJECTION, SOLUTION PARENTERAL at 23:55

## 2021-06-03 RX ADMIN — SODIUM CHLORIDE 1000 ML: 900 INJECTION, SOLUTION INTRAVENOUS at 23:36

## 2021-06-04 VITALS
RESPIRATION RATE: 18 BRPM | HEIGHT: 65 IN | WEIGHT: 230 LBS | SYSTOLIC BLOOD PRESSURE: 140 MMHG | BODY MASS INDEX: 38.32 KG/M2 | OXYGEN SATURATION: 97 % | TEMPERATURE: 99 F | DIASTOLIC BLOOD PRESSURE: 78 MMHG | HEART RATE: 87 BPM

## 2021-06-04 LAB
GLUCOSE BLD STRIP.AUTO-MCNC: 181 MG/DL (ref 65–100)
SERVICE CMNT-IMP: ABNORMAL

## 2021-06-04 PROCEDURE — 82962 GLUCOSE BLOOD TEST: CPT

## 2021-06-04 NOTE — ED PROVIDER NOTES
Patient with a history of high blood pressure and diabetes. Takes Metformin for his blood sugar. Went to work feeling well. Around 1400 had a dry mouth. Blood sugar up above 200. Took a Metformin and drink fluids all day. Blood sugar never lowered and remained high to this evening so came in. Has some slight dizziness. Continue dry mouth. The history is provided by the patient. No  was used. High Blood Sugar   This is a new problem. The current episode started 6 to 12 hours ago. The problem occurs constantly. The problem has not changed since onset. The pain is associated with an unknown factor. The patient is experiencing no pain. Pertinent negatives include no fever, no diarrhea, no melena, no nausea, no vomiting, no constipation, no dysuria, no hematuria, no headaches, no chest pain and no back pain. Nothing worsens the pain. The pain is relieved by nothing. His past medical history is significant for DM. Past Medical History:   Diagnosis Date    Diabetes ([facility] [address]     type 2. Takes metformin    Hypertension        No past surgical history on file. History reviewed. No pertinent family history. Social History     Socioeconomic History    Marital status: SINGLE     Spouse name: Not on file    Number of children: Not on file    Years of education: Not on file    Highest education level: Not on file   Occupational History    Not on file   Tobacco Use    Smoking status: Never Smoker    Smokeless tobacco: Never Used   Substance and Sexual Activity    Alcohol use:  Yes     Alcohol/week: 24.0 standard drinks     Types: 24 Cans of beer per week    Drug use: Not Currently    Sexual activity: Not on file   Other Topics Concern    Not on file   Social History Narrative    Not on file     Social Determinants of Health     Financial Resource Strain:     Difficulty of Paying Living Expenses:    Food Insecurity:     Worried About Running Out of Food in the Last Year:  Ran Out of Food in the Last Year:    Transportation Needs:     Lack of Transportation (Medical):  Lack of Transportation (Non-Medical):    Physical Activity:     Days of Exercise per Week:     Minutes of Exercise per Session:    Stress:     Feeling of Stress :    Social Connections:     Frequency of Communication with Friends and Family:     Frequency of Social Gatherings with Friends and Family:     Attends Latter day Services:     Active Member of Clubs or Organizations:     Attends Club or Organization Meetings:     Marital Status:    Intimate Partner Violence:     Fear of Current or Ex-Partner:     Emotionally Abused:     Physically Abused:     Sexually Abused: ALLERGIES: Patient has no known allergies. Review of Systems   Constitutional: Negative for chills and fever. HENT: Negative for rhinorrhea and sore throat. Eyes: Negative for pain and redness. Respiratory: Negative for chest tightness, shortness of breath and wheezing. Cardiovascular: Negative for chest pain and leg swelling. Gastrointestinal: Negative for abdominal pain, constipation, diarrhea, melena, nausea and vomiting. Genitourinary: Negative for dysuria and hematuria. Musculoskeletal: Negative for back pain, gait problem, neck pain and neck stiffness. Skin: Negative for color change and rash. Neurological: Positive for dizziness. Negative for weakness, numbness and headaches. All other systems reviewed and are negative. Vitals:    06/03/21 2140   BP: (!) 154/87   Pulse: 87   Resp: 18   Temp: 98.9 °F (37.2 °C)   SpO2: 97%   Weight: 104.3 kg (230 lb)   Height: 5' 5\" (1.651 m)            Physical Exam  Constitutional:       Appearance: Normal appearance. He is well-developed. HENT:      Head: Normocephalic and atraumatic. Cardiovascular:      Rate and Rhythm: Normal rate and regular rhythm. Pulmonary:      Effort: Pulmonary effort is normal.      Breath sounds: Normal breath sounds. Abdominal:      General: Bowel sounds are normal.      Palpations: Abdomen is soft. Tenderness: There is no abdominal tenderness. Musculoskeletal:         General: Normal range of motion. Cervical back: Normal range of motion and neck supple. Skin:     General: Skin is warm and dry. Neurological:      General: No focal deficit present. Mental Status: He is alert and oriented to person, place, and time. MDM  Number of Diagnoses or Management Options  Diagnosis management comments: Blood sugar improved. Will monitor at home and follow-up with PCP. Amount and/or Complexity of Data Reviewed  Clinical lab tests: ordered and reviewed  Tests in the medicine section of CPT®: ordered and reviewed    Patient Progress  Patient progress: stable         Procedures               Results Include:    Recent Results (from the past 24 hour(s))   GLUCOSE, POC    Collection Time: 06/03/21  9:39 PM   Result Value Ref Range    Glucose (POC) 285 (H) 65 - 100 mg/dL    Performed by Gema    CBC WITH AUTOMATED DIFF    Collection Time: 06/03/21 10:14 PM   Result Value Ref Range    WBC 5.2 4.3 - 11.1 K/uL    RBC 4.70 4.23 - 5.6 M/uL    HGB 15.4 13.6 - 17.2 g/dL    HCT 42.4 41.1 - 50.3 %    MCV 90.2 79.6 - 97.8 FL    MCH 32.8 26.1 - 32.9 PG    MCHC 36.3 (H) 31.4 - 35.0 g/dL    RDW 11.9 11.9 - 14.6 %    PLATELET 806 023 - 920 K/uL    MPV 10.2 9.4 - 12.3 FL    ABSOLUTE NRBC 0.00 0.0 - 0.2 K/uL    DF AUTOMATED      NEUTROPHILS 48 43 - 78 %    LYMPHOCYTES 41 13 - 44 %    MONOCYTES 7 4.0 - 12.0 %    EOSINOPHILS 3 0.5 - 7.8 %    BASOPHILS 1 0.0 - 2.0 %    IMMATURE GRANULOCYTES 0 0.0 - 5.0 %    ABS. NEUTROPHILS 2.5 1.7 - 8.2 K/UL    ABS. LYMPHOCYTES 2.1 0.5 - 4.6 K/UL    ABS. MONOCYTES 0.4 0.1 - 1.3 K/UL    ABS. EOSINOPHILS 0.2 0.0 - 0.8 K/UL    ABS. BASOPHILS 0.0 0.0 - 0.2 K/UL    ABS. IMM.  GRANS. 0.0 0.0 - 0.5 K/UL   METABOLIC PANEL, COMPREHENSIVE    Collection Time: 06/03/21 10:14 PM   Result Value Ref Range    Sodium 136 136 - 145 mmol/L    Potassium 4.2 3.5 - 5.1 mmol/L    Chloride 96 (L) 98 - 107 mmol/L    CO2 25 21 - 32 mmol/L    Anion gap 15 7 - 16 mmol/L    Glucose 294 (H) 65 - 100 mg/dL    BUN 11 6 - 23 MG/DL    Creatinine 0.85 0.8 - 1.5 MG/DL    GFR est AA >60 >60 ml/min/1.73m2    GFR est non-AA >60 >60 ml/min/1.73m2    Calcium 8.6 8.3 - 10.4 MG/DL    Bilirubin, total 0.8 0.2 - 1.1 MG/DL    ALT (SGPT) 154 (H) 12 - 65 U/L    AST (SGOT) 78 (H) 15 - 37 U/L    Alk. phosphatase 114 50 - 136 U/L    Protein, total 7.1 6.3 - 8.2 g/dL    Albumin 4.2 3.5 - 5.0 g/dL    Globulin 2.9 2.3 - 3.5 g/dL    A-G Ratio 1.4 1.2 - 3.5     GLUCOSE, POC    Collection Time: 06/04/21  1:14 AM   Result Value Ref Range    Glucose (POC) 181 (H) 65 - 100 mg/dL    Performed by Joselito Mathis

## 2021-06-04 NOTE — ED NOTES
I have reviewed discharge instructions with the patient. The patient verbalized understanding. Patient left ED via Discharge Method: ambulatory to Home with family. Opportunity for questions and clarification provided. Patient given 0 scripts. To continue your aftercare when you leave the hospital, you may receive an automated call from our care team to check in on how you are doing. This is a free service and part of our promise to provide the best care and service to meet your aftercare needs.  If you have questions, or wish to unsubscribe from this service please call [phone]. Thank you for Choosing our [facility] Emergency Department.

## 2021-06-04 NOTE — ED TRIAGE NOTES
Pt states that around 1500, pt began felling poorly. Pt checked his BS and he was 350. Pt states that his blood sugar has not been going down.  in triage.